# Patient Record
Sex: MALE | Employment: FULL TIME | URBAN - METROPOLITAN AREA
[De-identification: names, ages, dates, MRNs, and addresses within clinical notes are randomized per-mention and may not be internally consistent; named-entity substitution may affect disease eponyms.]

---

## 2020-06-28 ENCOUNTER — ANESTHESIA (OUTPATIENT)
Dept: SURGERY | Facility: CLINIC | Age: 55
DRG: 854 | End: 2020-06-28
Payer: COMMERCIAL

## 2020-06-28 ENCOUNTER — HOSPITAL ENCOUNTER (INPATIENT)
Facility: CLINIC | Age: 55
LOS: 3 days | Discharge: HOME | DRG: 854 | End: 2020-07-01
Attending: EMERGENCY MEDICINE | Admitting: INTERNAL MEDICINE
Payer: COMMERCIAL

## 2020-06-28 ENCOUNTER — APPOINTMENT (OUTPATIENT)
Dept: GENERAL RADIOLOGY | Facility: CLINIC | Age: 55
DRG: 854 | End: 2020-06-28
Attending: EMERGENCY MEDICINE
Payer: COMMERCIAL

## 2020-06-28 ENCOUNTER — ANESTHESIA EVENT (OUTPATIENT)
Dept: SURGERY | Facility: CLINIC | Age: 55
DRG: 854 | End: 2020-06-28
Payer: COMMERCIAL

## 2020-06-28 ENCOUNTER — APPOINTMENT (OUTPATIENT)
Dept: ULTRASOUND IMAGING | Facility: CLINIC | Age: 55
DRG: 854 | End: 2020-06-28
Attending: EMERGENCY MEDICINE
Payer: COMMERCIAL

## 2020-06-28 ENCOUNTER — APPOINTMENT (OUTPATIENT)
Dept: GENERAL RADIOLOGY | Facility: CLINIC | Age: 55
DRG: 854 | End: 2020-06-28
Attending: INTERNAL MEDICINE
Payer: COMMERCIAL

## 2020-06-28 DIAGNOSIS — E11.9 TYPE 2 DIABETES MELLITUS WITHOUT COMPLICATION, WITHOUT LONG-TERM CURRENT USE OF INSULIN (H): ICD-10-CM

## 2020-06-28 DIAGNOSIS — K85.90 ACUTE PANCREATITIS, UNSPECIFIED COMPLICATION STATUS, UNSPECIFIED PANCREATITIS TYPE: ICD-10-CM

## 2020-06-28 DIAGNOSIS — K83.09 CHOLANGITIS (H): Primary | ICD-10-CM

## 2020-06-28 DIAGNOSIS — Z03.818 ENCNTR FOR OBS FOR SUSP EXPSR TO OTH BIOLG AGENTS RULED OUT: ICD-10-CM

## 2020-06-28 DIAGNOSIS — K83.9 HEPATOBILIARY TRACT DISEASE: ICD-10-CM

## 2020-06-28 LAB
ALBUMIN SERPL-MCNC: 3.8 G/DL (ref 3.4–5)
ALP SERPL-CCNC: 155 U/L (ref 40–150)
ALT SERPL W P-5'-P-CCNC: 343 U/L (ref 0–70)
ANION GAP SERPL CALCULATED.3IONS-SCNC: 7 MMOL/L (ref 3–14)
AST SERPL W P-5'-P-CCNC: 494 U/L (ref 0–45)
BASOPHILS # BLD AUTO: 0 10E9/L (ref 0–0.2)
BASOPHILS NFR BLD AUTO: 0.1 %
BILIRUB SERPL-MCNC: 3.2 MG/DL (ref 0.2–1.3)
BUN SERPL-MCNC: 14 MG/DL (ref 7–30)
CALCIUM SERPL-MCNC: 9.1 MG/DL (ref 8.5–10.1)
CHLORIDE SERPL-SCNC: 104 MMOL/L (ref 94–109)
CO2 SERPL-SCNC: 27 MMOL/L (ref 20–32)
CREAT SERPL-MCNC: 1.08 MG/DL (ref 0.66–1.25)
DIFFERENTIAL METHOD BLD: ABNORMAL
EOSINOPHIL # BLD AUTO: 0 10E9/L (ref 0–0.7)
EOSINOPHIL NFR BLD AUTO: 0.1 %
ERYTHROCYTE [DISTWIDTH] IN BLOOD BY AUTOMATED COUNT: 12.7 % (ref 10–15)
GFR SERPL CREATININE-BSD FRML MDRD: 77 ML/MIN/{1.73_M2}
GLUCOSE BLDC GLUCOMTR-MCNC: 156 MG/DL (ref 70–99)
GLUCOSE BLDC GLUCOMTR-MCNC: 170 MG/DL (ref 70–99)
GLUCOSE BLDC GLUCOMTR-MCNC: 185 MG/DL (ref 70–99)
GLUCOSE BLDC GLUCOMTR-MCNC: 192 MG/DL (ref 70–99)
GLUCOSE BLDC GLUCOMTR-MCNC: 220 MG/DL (ref 70–99)
GLUCOSE SERPL-MCNC: 259 MG/DL (ref 70–99)
HBA1C MFR BLD: 6.9 % (ref 0–5.6)
HCT VFR BLD AUTO: 44.9 % (ref 40–53)
HGB BLD-MCNC: 14.6 G/DL (ref 13.3–17.7)
IMM GRANULOCYTES # BLD: 0.1 10E9/L (ref 0–0.4)
IMM GRANULOCYTES NFR BLD: 0.4 %
INR PPP: 0.98 (ref 0.86–1.14)
INTERPRETATION ECG - MUSE: NORMAL
LACTATE BLD-SCNC: 1 MMOL/L (ref 0.7–2)
LIPASE SERPL-CCNC: 998 U/L (ref 73–393)
LYMPHOCYTES # BLD AUTO: 0.4 10E9/L (ref 0.8–5.3)
LYMPHOCYTES NFR BLD AUTO: 2.5 %
MCH RBC QN AUTO: 28.2 PG (ref 26.5–33)
MCHC RBC AUTO-ENTMCNC: 32.5 G/DL (ref 31.5–36.5)
MCV RBC AUTO: 87 FL (ref 78–100)
MONOCYTES # BLD AUTO: 0.6 10E9/L (ref 0–1.3)
MONOCYTES NFR BLD AUTO: 3.5 %
NEUTROPHILS # BLD AUTO: 15.1 10E9/L (ref 1.6–8.3)
NEUTROPHILS NFR BLD AUTO: 93.4 %
NRBC # BLD AUTO: 0 10*3/UL
NRBC BLD AUTO-RTO: 0 /100
NT-PROBNP SERPL-MCNC: 50 PG/ML (ref 0–900)
PLATELET # BLD AUTO: 329 10E9/L (ref 150–450)
POTASSIUM SERPL-SCNC: 3.8 MMOL/L (ref 3.4–5.3)
PROT SERPL-MCNC: 7.6 G/DL (ref 6.8–8.8)
RBC # BLD AUTO: 5.17 10E12/L (ref 4.4–5.9)
SARS-COV-2 PCR COMMENT: NORMAL
SARS-COV-2 RNA SPEC QL NAA+PROBE: NEGATIVE
SARS-COV-2 RNA SPEC QL NAA+PROBE: NORMAL
SODIUM SERPL-SCNC: 138 MMOL/L (ref 133–144)
SPECIMEN SOURCE: NORMAL
SPECIMEN SOURCE: NORMAL
TROPONIN I BLD-MCNC: 0 UG/L (ref 0–0.08)
TROPONIN I SERPL-MCNC: <0.015 UG/L (ref 0–0.04)
WBC # BLD AUTO: 16.1 10E9/L (ref 4–11)

## 2020-06-28 PROCEDURE — 96367 TX/PROPH/DG ADDL SEQ IV INF: CPT | Performed by: EMERGENCY MEDICINE

## 2020-06-28 PROCEDURE — 12000001 ZZH R&B MED SURG/OB UMMC

## 2020-06-28 PROCEDURE — 85610 PROTHROMBIN TIME: CPT | Performed by: EMERGENCY MEDICINE

## 2020-06-28 PROCEDURE — 76705 ECHO EXAM OF ABDOMEN: CPT

## 2020-06-28 PROCEDURE — U0003 INFECTIOUS AGENT DETECTION BY NUCLEIC ACID (DNA OR RNA); SEVERE ACUTE RESPIRATORY SYNDROME CORONAVIRUS 2 (SARS-COV-2) (CORONAVIRUS DISEASE [COVID-19]), AMPLIFIED PROBE TECHNIQUE, MAKING USE OF HIGH THROUGHPUT TECHNOLOGIES AS DESCRIBED BY CMS-2020-01-R: HCPCS | Performed by: EMERGENCY MEDICINE

## 2020-06-28 PROCEDURE — 25500064 ZZH RX 255 OP 636: Performed by: INTERNAL MEDICINE

## 2020-06-28 PROCEDURE — 96375 TX/PRO/DX INJ NEW DRUG ADDON: CPT | Performed by: EMERGENCY MEDICINE

## 2020-06-28 PROCEDURE — 0F798DZ DILATION OF COMMON BILE DUCT WITH INTRALUMINAL DEVICE, VIA NATURAL OR ARTIFICIAL OPENING ENDOSCOPIC: ICD-10-PCS | Performed by: INTERNAL MEDICINE

## 2020-06-28 PROCEDURE — 40000141 ZZH STATISTIC PERIPHERAL IV START W/O US GUIDANCE

## 2020-06-28 PROCEDURE — 00000146 ZZHCL STATISTIC GLUCOSE BY METER IP

## 2020-06-28 PROCEDURE — 99285 EMERGENCY DEPT VISIT HI MDM: CPT | Mod: 25 | Performed by: EMERGENCY MEDICINE

## 2020-06-28 PROCEDURE — 93005 ELECTROCARDIOGRAM TRACING: CPT | Performed by: EMERGENCY MEDICINE

## 2020-06-28 PROCEDURE — 25000125 ZZHC RX 250: Performed by: NURSE ANESTHETIST, CERTIFIED REGISTERED

## 2020-06-28 PROCEDURE — 25000125 ZZHC RX 250: Performed by: ANESTHESIOLOGY

## 2020-06-28 PROCEDURE — 71000014 ZZH RECOVERY PHASE 1 LEVEL 2 FIRST HR: Performed by: INTERNAL MEDICINE

## 2020-06-28 PROCEDURE — 37000008 ZZH ANESTHESIA TECHNICAL FEE, 1ST 30 MIN: Performed by: INTERNAL MEDICINE

## 2020-06-28 PROCEDURE — C1769 GUIDE WIRE: HCPCS | Performed by: INTERNAL MEDICINE

## 2020-06-28 PROCEDURE — 36000059 ZZH SURGERY LEVEL 3 EA 15 ADDTL MIN UMMC: Performed by: INTERNAL MEDICINE

## 2020-06-28 PROCEDURE — 25000132 ZZH RX MED GY IP 250 OP 250 PS 637: Performed by: PHYSICIAN ASSISTANT

## 2020-06-28 PROCEDURE — 25000128 H RX IP 250 OP 636: Performed by: NURSE ANESTHETIST, CERTIFIED REGISTERED

## 2020-06-28 PROCEDURE — 25000128 H RX IP 250 OP 636: Performed by: ANESTHESIOLOGY

## 2020-06-28 PROCEDURE — 25000566 ZZH SEVOFLURANE, EA 15 MIN: Performed by: INTERNAL MEDICINE

## 2020-06-28 PROCEDURE — 40000279 XR SURGERY CARM FLUORO GREATER THAN 5 MIN W STILLS: Mod: TC

## 2020-06-28 PROCEDURE — 87077 CULTURE AEROBIC IDENTIFY: CPT | Performed by: EMERGENCY MEDICINE

## 2020-06-28 PROCEDURE — 84484 ASSAY OF TROPONIN QUANT: CPT

## 2020-06-28 PROCEDURE — 25800030 ZZH RX IP 258 OP 636: Performed by: STUDENT IN AN ORGANIZED HEALTH CARE EDUCATION/TRAINING PROGRAM

## 2020-06-28 PROCEDURE — 0FC98ZZ EXTIRPATION OF MATTER FROM COMMON BILE DUCT, VIA NATURAL OR ARTIFICIAL OPENING ENDOSCOPIC: ICD-10-PCS | Performed by: INTERNAL MEDICINE

## 2020-06-28 PROCEDURE — 25000132 ZZH RX MED GY IP 250 OP 250 PS 637: Performed by: STUDENT IN AN ORGANIZED HEALTH CARE EDUCATION/TRAINING PROGRAM

## 2020-06-28 PROCEDURE — 96361 HYDRATE IV INFUSION ADD-ON: CPT | Performed by: EMERGENCY MEDICINE

## 2020-06-28 PROCEDURE — 83690 ASSAY OF LIPASE: CPT | Performed by: EMERGENCY MEDICINE

## 2020-06-28 PROCEDURE — 87040 BLOOD CULTURE FOR BACTERIA: CPT | Performed by: EMERGENCY MEDICINE

## 2020-06-28 PROCEDURE — 85025 COMPLETE CBC W/AUTO DIFF WBC: CPT | Performed by: EMERGENCY MEDICINE

## 2020-06-28 PROCEDURE — 36000061 ZZH SURGERY LEVEL 3 W FLUORO 1ST 30 MIN - UMMC: Performed by: INTERNAL MEDICINE

## 2020-06-28 PROCEDURE — 84484 ASSAY OF TROPONIN QUANT: CPT | Performed by: EMERGENCY MEDICINE

## 2020-06-28 PROCEDURE — 40000170 ZZH STATISTIC PRE-PROCEDURE ASSESSMENT II: Performed by: INTERNAL MEDICINE

## 2020-06-28 PROCEDURE — 83880 ASSAY OF NATRIURETIC PEPTIDE: CPT | Performed by: EMERGENCY MEDICINE

## 2020-06-28 PROCEDURE — C1877 STENT, NON-COAT/COV W/O DEL: HCPCS | Performed by: INTERNAL MEDICINE

## 2020-06-28 PROCEDURE — 87186 SC STD MICRODIL/AGAR DIL: CPT | Performed by: EMERGENCY MEDICINE

## 2020-06-28 PROCEDURE — 83036 HEMOGLOBIN GLYCOSYLATED A1C: CPT | Performed by: EMERGENCY MEDICINE

## 2020-06-28 PROCEDURE — 25000128 H RX IP 250 OP 636: Performed by: EMERGENCY MEDICINE

## 2020-06-28 PROCEDURE — 25000128 H RX IP 250 OP 636: Performed by: STUDENT IN AN ORGANIZED HEALTH CARE EDUCATION/TRAINING PROGRAM

## 2020-06-28 PROCEDURE — 0F7D8DZ DILATION OF PANCREATIC DUCT WITH INTRALUMINAL DEVICE, VIA NATURAL OR ARTIFICIAL OPENING ENDOSCOPIC: ICD-10-PCS | Performed by: INTERNAL MEDICINE

## 2020-06-28 PROCEDURE — 83605 ASSAY OF LACTIC ACID: CPT | Performed by: EMERGENCY MEDICINE

## 2020-06-28 PROCEDURE — 25800030 ZZH RX IP 258 OP 636: Performed by: EMERGENCY MEDICINE

## 2020-06-28 PROCEDURE — 37000009 ZZH ANESTHESIA TECHNICAL FEE, EACH ADDTL 15 MIN: Performed by: INTERNAL MEDICINE

## 2020-06-28 PROCEDURE — C9803 HOPD COVID-19 SPEC COLLECT: HCPCS

## 2020-06-28 PROCEDURE — 71045 X-RAY EXAM CHEST 1 VIEW: CPT

## 2020-06-28 PROCEDURE — C1726 CATH, BAL DIL, NON-VASCULAR: HCPCS | Performed by: INTERNAL MEDICINE

## 2020-06-28 PROCEDURE — 80053 COMPREHEN METABOLIC PANEL: CPT | Performed by: EMERGENCY MEDICINE

## 2020-06-28 PROCEDURE — 99223 1ST HOSP IP/OBS HIGH 75: CPT | Mod: AI | Performed by: INTERNAL MEDICINE

## 2020-06-28 PROCEDURE — 96365 THER/PROPH/DIAG IV INF INIT: CPT | Performed by: EMERGENCY MEDICINE

## 2020-06-28 PROCEDURE — 25800030 ZZH RX IP 258 OP 636: Performed by: NURSE ANESTHETIST, CERTIFIED REGISTERED

## 2020-06-28 PROCEDURE — 25000131 ZZH RX MED GY IP 250 OP 636 PS 637: Performed by: PHYSICIAN ASSISTANT

## 2020-06-28 PROCEDURE — 27210794 ZZH OR GENERAL SUPPLY STERILE: Performed by: INTERNAL MEDICINE

## 2020-06-28 PROCEDURE — 93010 ELECTROCARDIOGRAM REPORT: CPT | Mod: Z6 | Performed by: EMERGENCY MEDICINE

## 2020-06-28 PROCEDURE — 87800 DETECT AGNT MULT DNA DIREC: CPT | Performed by: EMERGENCY MEDICINE

## 2020-06-28 PROCEDURE — 25000125 ZZHC RX 250: Performed by: INTERNAL MEDICINE

## 2020-06-28 DEVICE — IMPLANTABLE DEVICE: Type: IMPLANTABLE DEVICE | Site: BILE DUCT | Status: FUNCTIONAL

## 2020-06-28 DEVICE — STENT FREEMAN PANCREA FLEX 4FRX09CM W/O FLANGE SGL PIGTAIL
Type: IMPLANTABLE DEVICE | Site: PANCREATIC DUCT | Status: NON-FUNCTIONAL
Removed: 2020-06-30

## 2020-06-28 RX ORDER — METFORMIN HCL 500 MG
2000 TABLET, EXTENDED RELEASE 24 HR ORAL
COMMUNITY

## 2020-06-28 RX ORDER — ONDANSETRON 2 MG/ML
INJECTION INTRAMUSCULAR; INTRAVENOUS PRN
Status: DISCONTINUED | OUTPATIENT
Start: 2020-06-28 | End: 2020-06-28

## 2020-06-28 RX ORDER — LIDOCAINE 40 MG/G
CREAM TOPICAL
Status: DISCONTINUED | OUTPATIENT
Start: 2020-06-28 | End: 2020-07-01 | Stop reason: HOSPADM

## 2020-06-28 RX ORDER — DEXTROSE MONOHYDRATE 25 G/50ML
25-50 INJECTION, SOLUTION INTRAVENOUS
Status: DISCONTINUED | OUTPATIENT
Start: 2020-06-28 | End: 2020-06-29

## 2020-06-28 RX ORDER — ONDANSETRON 4 MG/1
4 TABLET, ORALLY DISINTEGRATING ORAL EVERY 30 MIN PRN
Status: DISCONTINUED | OUTPATIENT
Start: 2020-06-28 | End: 2020-06-28 | Stop reason: HOSPADM

## 2020-06-28 RX ORDER — SIMVASTATIN 10 MG
10 TABLET ORAL AT BEDTIME
Status: ON HOLD | COMMUNITY
End: 2020-06-28

## 2020-06-28 RX ORDER — FENTANYL CITRATE 50 UG/ML
25-50 INJECTION, SOLUTION INTRAMUSCULAR; INTRAVENOUS
Status: DISCONTINUED | OUTPATIENT
Start: 2020-06-28 | End: 2020-06-28 | Stop reason: HOSPADM

## 2020-06-28 RX ORDER — SIMVASTATIN 10 MG
20 TABLET ORAL AT BEDTIME
Status: DISCONTINUED | OUTPATIENT
Start: 2020-06-28 | End: 2020-07-01 | Stop reason: HOSPADM

## 2020-06-28 RX ORDER — DEXAMETHASONE SODIUM PHOSPHATE 4 MG/ML
INJECTION, SOLUTION INTRA-ARTICULAR; INTRALESIONAL; INTRAMUSCULAR; INTRAVENOUS; SOFT TISSUE PRN
Status: DISCONTINUED | OUTPATIENT
Start: 2020-06-28 | End: 2020-06-28

## 2020-06-28 RX ORDER — FENTANYL CITRATE 50 UG/ML
INJECTION, SOLUTION INTRAMUSCULAR; INTRAVENOUS PRN
Status: DISCONTINUED | OUTPATIENT
Start: 2020-06-28 | End: 2020-06-28

## 2020-06-28 RX ORDER — SIMVASTATIN 20 MG
20 TABLET ORAL AT BEDTIME
Status: ON HOLD | COMMUNITY
End: 2020-07-01

## 2020-06-28 RX ORDER — IOPAMIDOL 510 MG/ML
INJECTION, SOLUTION INTRAVASCULAR PRN
Status: DISCONTINUED | OUTPATIENT
Start: 2020-06-28 | End: 2020-06-28 | Stop reason: HOSPADM

## 2020-06-28 RX ORDER — CEFTRIAXONE 2 G/1
2 INJECTION, POWDER, FOR SOLUTION INTRAMUSCULAR; INTRAVENOUS ONCE
Status: COMPLETED | OUTPATIENT
Start: 2020-06-28 | End: 2020-06-28

## 2020-06-28 RX ORDER — ACETAMINOPHEN 325 MG/1
650 TABLET ORAL EVERY 6 HOURS PRN
Status: DISCONTINUED | OUTPATIENT
Start: 2020-06-28 | End: 2020-07-01 | Stop reason: HOSPADM

## 2020-06-28 RX ORDER — CEFTRIAXONE 2 G/1
2 INJECTION, POWDER, FOR SOLUTION INTRAMUSCULAR; INTRAVENOUS EVERY 24 HOURS
Status: DISCONTINUED | OUTPATIENT
Start: 2020-06-29 | End: 2020-06-28

## 2020-06-28 RX ORDER — HYDROMORPHONE HYDROCHLORIDE 1 MG/ML
0.2 INJECTION, SOLUTION INTRAMUSCULAR; INTRAVENOUS; SUBCUTANEOUS
Status: DISCONTINUED | OUTPATIENT
Start: 2020-06-28 | End: 2020-06-30

## 2020-06-28 RX ORDER — PROPOFOL 10 MG/ML
INJECTION, EMULSION INTRAVENOUS PRN
Status: DISCONTINUED | OUTPATIENT
Start: 2020-06-28 | End: 2020-06-28

## 2020-06-28 RX ORDER — LIDOCAINE 40 MG/G
CREAM TOPICAL
Status: DISCONTINUED | OUTPATIENT
Start: 2020-06-28 | End: 2020-06-28 | Stop reason: HOSPADM

## 2020-06-28 RX ORDER — NALOXONE HYDROCHLORIDE 0.4 MG/ML
.1-.4 INJECTION, SOLUTION INTRAMUSCULAR; INTRAVENOUS; SUBCUTANEOUS
Status: ACTIVE | OUTPATIENT
Start: 2020-06-28 | End: 2020-06-29

## 2020-06-28 RX ORDER — NICOTINE POLACRILEX 4 MG
15-30 LOZENGE BUCCAL
Status: DISCONTINUED | OUTPATIENT
Start: 2020-06-28 | End: 2020-06-29

## 2020-06-28 RX ORDER — GLIMEPIRIDE 2 MG/1
2 TABLET ORAL
COMMUNITY

## 2020-06-28 RX ORDER — HYDROMORPHONE HYDROCHLORIDE 1 MG/ML
0.5 INJECTION, SOLUTION INTRAMUSCULAR; INTRAVENOUS; SUBCUTANEOUS ONCE
Status: COMPLETED | OUTPATIENT
Start: 2020-06-28 | End: 2020-06-28

## 2020-06-28 RX ORDER — HYDROMORPHONE HYDROCHLORIDE 1 MG/ML
.3-.5 INJECTION, SOLUTION INTRAMUSCULAR; INTRAVENOUS; SUBCUTANEOUS EVERY 5 MIN PRN
Status: DISCONTINUED | OUTPATIENT
Start: 2020-06-28 | End: 2020-06-28 | Stop reason: HOSPADM

## 2020-06-28 RX ORDER — LIDOCAINE HYDROCHLORIDE 20 MG/ML
INJECTION, SOLUTION INFILTRATION; PERINEURAL PRN
Status: DISCONTINUED | OUTPATIENT
Start: 2020-06-28 | End: 2020-06-28

## 2020-06-28 RX ORDER — SODIUM CHLORIDE, SODIUM LACTATE, POTASSIUM CHLORIDE, CALCIUM CHLORIDE 600; 310; 30; 20 MG/100ML; MG/100ML; MG/100ML; MG/100ML
INJECTION, SOLUTION INTRAVENOUS CONTINUOUS
Status: ACTIVE | OUTPATIENT
Start: 2020-06-28 | End: 2020-06-30

## 2020-06-28 RX ORDER — SODIUM CHLORIDE, SODIUM LACTATE, POTASSIUM CHLORIDE, CALCIUM CHLORIDE 600; 310; 30; 20 MG/100ML; MG/100ML; MG/100ML; MG/100ML
INJECTION, SOLUTION INTRAVENOUS CONTINUOUS PRN
Status: DISCONTINUED | OUTPATIENT
Start: 2020-06-28 | End: 2020-06-28

## 2020-06-28 RX ORDER — NALOXONE HYDROCHLORIDE 0.4 MG/ML
.1-.4 INJECTION, SOLUTION INTRAMUSCULAR; INTRAVENOUS; SUBCUTANEOUS
Status: DISCONTINUED | OUTPATIENT
Start: 2020-06-28 | End: 2020-07-01 | Stop reason: HOSPADM

## 2020-06-28 RX ORDER — ONDANSETRON 2 MG/ML
4 INJECTION INTRAMUSCULAR; INTRAVENOUS EVERY 30 MIN PRN
Status: DISCONTINUED | OUTPATIENT
Start: 2020-06-28 | End: 2020-06-28 | Stop reason: HOSPADM

## 2020-06-28 RX ORDER — INDOMETHACIN 50 MG/1
100 SUPPOSITORY RECTAL
Status: DISCONTINUED | OUTPATIENT
Start: 2020-06-28 | End: 2020-06-28 | Stop reason: HOSPADM

## 2020-06-28 RX ORDER — FLUMAZENIL 0.1 MG/ML
0.2 INJECTION, SOLUTION INTRAVENOUS
Status: ACTIVE | OUTPATIENT
Start: 2020-06-28 | End: 2020-06-29

## 2020-06-28 RX ORDER — SODIUM CHLORIDE, SODIUM LACTATE, POTASSIUM CHLORIDE, CALCIUM CHLORIDE 600; 310; 30; 20 MG/100ML; MG/100ML; MG/100ML; MG/100ML
INJECTION, SOLUTION INTRAVENOUS CONTINUOUS
Status: DISCONTINUED | OUTPATIENT
Start: 2020-06-28 | End: 2020-06-28 | Stop reason: HOSPADM

## 2020-06-28 RX ADMIN — ROCURONIUM BROMIDE 50 MG: 10 INJECTION INTRAVENOUS at 11:25

## 2020-06-28 RX ADMIN — PHENYLEPHRINE HYDROCHLORIDE 100 MCG: 10 INJECTION INTRAVENOUS at 12:06

## 2020-06-28 RX ADMIN — ACETAMINOPHEN 650 MG: 325 TABLET, FILM COATED ORAL at 19:43

## 2020-06-28 RX ADMIN — MIDAZOLAM 2 MG: 1 INJECTION INTRAMUSCULAR; INTRAVENOUS at 11:10

## 2020-06-28 RX ADMIN — PHENYLEPHRINE HYDROCHLORIDE 100 MCG: 10 INJECTION INTRAVENOUS at 11:40

## 2020-06-28 RX ADMIN — METRONIDAZOLE 500 MG: 500 INJECTION, SOLUTION INTRAVENOUS at 06:24

## 2020-06-28 RX ADMIN — SODIUM CHLORIDE, POTASSIUM CHLORIDE, SODIUM LACTATE AND CALCIUM CHLORIDE: 600; 310; 30; 20 INJECTION, SOLUTION INTRAVENOUS at 11:15

## 2020-06-28 RX ADMIN — FENTANYL CITRATE 50 MCG: 50 INJECTION, SOLUTION INTRAMUSCULAR; INTRAVENOUS at 11:22

## 2020-06-28 RX ADMIN — CEFEPIME 2 G: 2 INJECTION, POWDER, FOR SOLUTION INTRAVENOUS at 20:02

## 2020-06-28 RX ADMIN — DEXAMETHASONE SODIUM PHOSPHATE 4 MG: 4 INJECTION, SOLUTION INTRA-ARTICULAR; INTRALESIONAL; INTRAMUSCULAR; INTRAVENOUS; SOFT TISSUE at 11:29

## 2020-06-28 RX ADMIN — SIMVASTATIN 20 MG: 10 TABLET, FILM COATED ORAL at 22:40

## 2020-06-28 RX ADMIN — GLUCAGON HYDROCHLORIDE 0.4 MG: KIT at 12:01

## 2020-06-28 RX ADMIN — HYDROMORPHONE HYDROCHLORIDE 0.5 MG: 1 INJECTION, SOLUTION INTRAMUSCULAR; INTRAVENOUS; SUBCUTANEOUS at 04:53

## 2020-06-28 RX ADMIN — ONDANSETRON 4 MG: 2 INJECTION INTRAMUSCULAR; INTRAVENOUS at 11:29

## 2020-06-28 RX ADMIN — SODIUM CHLORIDE, POTASSIUM CHLORIDE, SODIUM LACTATE AND CALCIUM CHLORIDE 1000 ML: 600; 310; 30; 20 INJECTION, SOLUTION INTRAVENOUS at 08:18

## 2020-06-28 RX ADMIN — SODIUM CHLORIDE, POTASSIUM CHLORIDE, SODIUM LACTATE AND CALCIUM CHLORIDE: 600; 310; 30; 20 INJECTION, SOLUTION INTRAVENOUS at 15:35

## 2020-06-28 RX ADMIN — SODIUM CHLORIDE 1000 ML: 9 INJECTION, SOLUTION INTRAVENOUS at 04:52

## 2020-06-28 RX ADMIN — PROCHLORPERAZINE EDISYLATE 10 MG: 5 INJECTION INTRAMUSCULAR; INTRAVENOUS at 04:55

## 2020-06-28 RX ADMIN — METRONIDAZOLE 500 MG: 500 INJECTION, SOLUTION INTRAVENOUS at 22:40

## 2020-06-28 RX ADMIN — FENTANYL CITRATE 50 MCG: 50 INJECTION, SOLUTION INTRAMUSCULAR; INTRAVENOUS at 11:52

## 2020-06-28 RX ADMIN — CEFTRIAXONE SODIUM 2 G: 2 INJECTION, POWDER, FOR SOLUTION INTRAMUSCULAR; INTRAVENOUS at 05:59

## 2020-06-28 RX ADMIN — METRONIDAZOLE 500 MG: 500 INJECTION, SOLUTION INTRAVENOUS at 15:34

## 2020-06-28 RX ADMIN — LIDOCAINE HYDROCHLORIDE 100 MG: 20 INJECTION, SOLUTION INFILTRATION; PERINEURAL at 11:22

## 2020-06-28 RX ADMIN — SUGAMMADEX 200 MG: 100 INJECTION, SOLUTION INTRAVENOUS at 12:21

## 2020-06-28 RX ADMIN — INSULIN ASPART 1 UNITS: 100 INJECTION, SOLUTION INTRAVENOUS; SUBCUTANEOUS at 16:36

## 2020-06-28 RX ADMIN — Medication 100 MG: at 11:22

## 2020-06-28 RX ADMIN — SODIUM CHLORIDE 1000 ML: 900 INJECTION, SOLUTION INTRAVENOUS at 09:33

## 2020-06-28 RX ADMIN — PROPOFOL 160 MG: 10 INJECTION, EMULSION INTRAVENOUS at 11:22

## 2020-06-28 ASSESSMENT — PAIN DESCRIPTION - DESCRIPTORS: DESCRIPTORS: HEADACHE

## 2020-06-28 ASSESSMENT — ACTIVITIES OF DAILY LIVING (ADL)
RETIRED_EATING: 0-->INDEPENDENT
DRESS: 0-->INDEPENDENT
BATHING: 0-->INDEPENDENT
TOILETING: 0-->INDEPENDENT
SWALLOWING: 0-->SWALLOWS FOODS/LIQUIDS WITHOUT DIFFICULTY
ADLS_ACUITY_SCORE: 10
ADLS_ACUITY_SCORE: 12
RETIRED_COMMUNICATION: 0-->UNDERSTANDS/COMMUNICATES WITHOUT DIFFICULTY

## 2020-06-28 ASSESSMENT — ENCOUNTER SYMPTOMS
VOMITING: 1
EYE REDNESS: 0
NECK STIFFNESS: 0
CONSTIPATION: 1
ARTHRALGIAS: 0
ABDOMINAL PAIN: 1
HEADACHES: 0
DIFFICULTY URINATING: 0
COLOR CHANGE: 0
SHORTNESS OF BREATH: 0
NAUSEA: 1
FEVER: 0
CONFUSION: 0

## 2020-06-28 ASSESSMENT — MIFFLIN-ST. JEOR: SCORE: 1760.25

## 2020-06-28 NOTE — ED NOTES
[facility], [place]   ED Nurse to Floor Handoff     Mio Benoit is a 55 year old male who speaks English and lives alone,  in a home  They arrived in the ED by ambulance from home    ED Chief Complaint: Chest Pain    ED Dx;   Final diagnoses:   Hepatobiliary tract disease         Needed?: No    Allergies:   Allergies   Allergen Reactions     Amoxicillin    .  Past Medical Hx:   Past Medical History:   Diagnosis Date     Diabetes (H)       Baseline Mental status: WDL  Current Mental Status changes: at basesline    Infection present or suspected this encounter: cultures pending  Sepsis suspected: No  Isolation type: No active isolations     Activity level - Baseline/Home:  Independent  Activity Level - Current:   Stand with Assist    Bariatric equipment needed?: No    In the ED these meds were given:   Medications   metroNIDAZOLE (FLAGYL) infusion 500 mg (500 mg Intravenous New Bag 6/28/20 0624)   0.9% sodium chloride BOLUS (0 mLs Intravenous Stopped 6/28/20 0624)   prochlorperazine (COMPAZINE) injection 10 mg (10 mg Intravenous Given 6/28/20 0455)   HYDROmorphone (PF) (DILAUDID) injection 0.5 mg (0.5 mg Intravenous Given 6/28/20 0453)   cefTRIAXone (ROCEPHIN) 2 g vial to attach to  ml bag for ADULTS or NS 50 ml bag for PEDS (0 g Intravenous Stopped 6/28/20 0624)       Drips running?  No    Home pump  No    Current LDAs  Peripheral IV 06/28/20 Right (Active)   Number of days: 0       Labs results:   Labs Ordered and Resulted from Time of ED Arrival Up to the Time of Departure from the ED   CBC WITH PLATELETS DIFFERENTIAL - Abnormal; Notable for the following components:       Result Value    WBC 16.1 (*)     Absolute Neutrophil 15.1 (*)     Absolute Lymphocytes 0.4 (*)     All other components within normal limits   COMPREHENSIVE METABOLIC PANEL - Abnormal; Notable for the following components:    Glucose 259 (*)     Bilirubin Total 3.2 (*)     Alkaline Phosphatase  155 (*)      (*)      (*)     All other components within normal limits   LIPASE - Abnormal; Notable for the following components:    Lipase 998 (*)     All other components within normal limits   TROPONIN I   NT PROBNP INPATIENT   COVID-19 VIRUS (CORONAVIRUS) BY PCR   LACTIC ACID WHOLE BLOOD   ROUTINE UA WITH MICROSCOPIC   PERIPHERAL IV CATHETER   TROPONIN POCT   BLOOD CULTURE   BLOOD CULTURE       Imaging Studies:   Recent Results (from the past 24 hour(s))   XR Chest Port 1 View    Narrative    EXAM: CHEST SINGLE VIEW PORTABLE  LOCATION: [facility]  DATE/TIME: 06/28/2020, 5:04 AM    INDICATION: Chest pain.  COMPARISON: None.    FINDINGS: The lungs are clear. Normal size cardiac silhouette      Impression    IMPRESSION: No evidence of active cardiopulmonary disease.      Abdomen US, limited (RUQ only)    Narrative    EXAMINATION: Limited Abdominal Ultrasound, 6/28/2020 6:42 AM     COMPARISON: None    History: RUQ Pain, evaluate for cholecystitis, RUQ Pain, evaluate for  cholecystitis.     FINDINGS:   Fluid: No evidence of ascites or pleural effusions.    Liver: The liver demonstrates mild hyperechoic architecture, measuring  14.5 cm in craniocaudal dimension. There is no focal mass.     Gallbladder: There is no wall thickening, pericholecystic fluid,  positive sonographic Moore's sign or evidence for cholelithiasis.    Bile Ducts: Intrahepatic biliary system appears normal caliber.  The  common bile duct measures 9.5 mm in diameter.    Pancreas: Visualized portions of the head and body of the pancreas are  unremarkable.     Kidney: The right kidney measures 12.2 cm long. There is no  hydronephrosis or hydroureter, no shadowing renal calculi, cystic  lesion or mass.       Impression    IMPRESSION:   1.  Hyperechoic liver parenchyma which can be seen in setting of  intrinsic intraparenchymal disease.  2.  Enlarged common bile duct without additional evidence of  cholelithiasis,  "choledocholithiasis or cholecystitis.     I have personally reviewed the examination and initial interpretation  and I agree with the findings.    OUMAR JACQUES MD       Recent vital signs:   BP (!) 141/82   Pulse 99   Temp 98.5  F (36.9  C) (Oral)   Resp 17   Ht 1.727 m (5' 8\")   Wt 94.6 kg (208 lb 8 oz)   SpO2 95%   BMI 31.70 kg/m      Freddie Coma Scale Score: 15 (06/28/20 0437)       Cardiac Rhythm: Normal Sinus  Pt needs tele? Yes  Skin/wound Issues: None    Code Status: Full Code    Pain control: good    Nausea control: good    Abnormal labs/tests/findings requiring intervention: see results. WBC 16.2    Family present during ED course? No   Family Comments/Social Situation comments: patient pleasant and cooperative    Tasks needing completion: DANNIE Martinez, RN  [phone] [facility] ED    "

## 2020-06-28 NOTE — PROGRESS NOTES
Admitted/transferred from: PACU  2 RN full   skin assessment completed by Yogesh Garza, RN and Светлана Oakes RN.  Skin assessment finding: skin intact, no problems   Interventions/actions: other N/A     Will continue to monitor.

## 2020-06-28 NOTE — ANESTHESIA PREPROCEDURE EVALUATION
"Anesthesia Pre-Procedure Evaluation    Patient: Mio Benoit   MRN:     [id number] Gender:   male   Age:    55 year old :      1965        Preoperative Diagnosis: Cholangitis [K83.09]   Procedure(s):  ENDOSCOPIC RETROGRADE CHOLANGIOPANCREATOGRAPHY     LABS:  CBC:   Lab Results   Component Value Date    WBC 16.1 (H) 2020    HGB 14.6 2020    HCT 44.9 2020     2020     BMP:   Lab Results   Component Value Date     2020     2002    POTASSIUM 3.8 2020    POTASSIUM 3.6 2002    CHLORIDE 104 2020    CHLORIDE 102 2002    CO2 27 2020    CO2 27 2002    BUN 14 2020    BUN 12 2002    CR 1.08 2020    CR 1.0 2002     (H) 2020     (H) 2002     COAGS:   Lab Results   Component Value Date    INR 0.98 2020     POC: No results found for: BGM, HCG, HCGS  OTHER:   Lab Results   Component Value Date    LACT 1.0 2020    ROMEO 9.1 2020    ALBUMIN 3.8 2020    PROTTOTAL 7.6 2020     (H) 2020     (H) 2020    ALKPHOS 155 (H) 2020    BILITOTAL 3.2 (H) 2020    LIPASE 998 (H) 2020        Preop Vitals    BP Readings from Last 3 Encounters:   20 (!) 141/87    Pulse Readings from Last 3 Encounters:   20 103      Resp Readings from Last 3 Encounters:   20 19    SpO2 Readings from Last 3 Encounters:   20 93%      Temp Readings from Last 1 Encounters:   20 36.9  C (98.5  F) (Oral)    Ht Readings from Last 1 Encounters:   20 1.727 m (5' 8\")      Wt Readings from Last 1 Encounters:   20 94.6 kg (208 lb 8 oz)    Estimated body mass index is 31.7 kg/m  as calculated from the following:    Height as of this encounter: 1.727 m (5' 8\").    Weight as of this encounter: 94.6 kg (208 lb 8 oz).     LDA:  Peripheral IV 20 Right (Active)   Number of days: 0        Past Medical History:   Diagnosis " Date     Diabetes (H)       History reviewed. No pertinent surgical history.   Allergies   Allergen Reactions     Amoxicillin         Anesthesia Evaluation     . Pt has had prior anesthetic.     No history of anesthetic complications          ROS/MED HX    ENT/Pulmonary: Comment: COVID negative 6/28 - neg pulmonary ROS     Neurologic:  - neg neurologic ROS     Cardiovascular:     (+) Dyslipidemia, ----. : . . . :. .       METS/Exercise Tolerance:  >4 METS   Hematologic:  - neg hematologic  ROS       Musculoskeletal:  - neg musculoskeletal ROS       GI/Hepatic: Comment: CBD dilation concerning for obstruction vs cholangitis        Renal/Genitourinary:  - ROS Renal section negative       Endo:     (+) type II DM Not using insulin .      Psychiatric:  - neg psychiatric ROS       Infectious Disease:         Malignancy:      - no malignancy   Other:    - neg other ROS                     PHYSICAL EXAM:   Mental Status/Neuro: A/A/O   Airway: Facies: Feasible  Mallampati: I  Mouth/Opening: Full  TM distance: > 6 cm  Neck ROM: Full   Respiratory: Auscultation: CTAB     Resp. Rate: Normal     Resp. Effort: Normal      CV: Rhythm: Regular  Rate: Age appropriate  Heart: Normal Sounds  Edema: None   Comments:      Dental: Normal Dentition                Assessment:   ASA SCORE: 2    H&P: History and physical reviewed and following examination; no interval change.   Smoking Status:  Non-Smoker/Unknown   NPO Status: NPO Appropriate     Plan:   Anes. Type:  General   Pre-Medication: None   Induction:  IV (Standard)   Airway: ETT; Oral   Access/Monitoring: PIV   Maintenance: Balanced     Postop Plan:   Postop Pain: Opioids  Postop Sedation/Airway: Not planned  Disposition: Inpatient/Admit     PONV Management:   Adult Risk Factors:, Non-Smoker, Postop Opioids   Prevention: Ondansetron, Dexamethasone     CONSENT: Direct conversation   Plan and risks discussed with: Patient   Blood Products: Consent Deferred (Minimal Blood Loss)                    Long Kemp, DO

## 2020-06-28 NOTE — H&P
[facility], [place]    History and Physical - Maroon 1 Service        Date of Admission:  6/28/2020    Assessment & Plan   Mio Benoit is a 55 year old male with a history of type 2 diabetes mellitus and hyperlipidemia admitted on 6/28/2020 with epigastric abdominal pain and rigors and was found to have cholangitis and gram negative destiny bacteremia.     Sepsis  Cholangitis  Gram negative destiny bacteremia  Leukocytosis to 16.1, tachycardia, and hypotension. Alk phos 155, , . Total bili 3.2. Abdominal ultrasound with enlarged common bile duct. S/p ERCP on 6/28. Now with 1/2 blood cultures positive for gram negative rods. Initially treated with ceftriaxone/metronidazole, will broaden for anti-pseudomonal coverage in the setting of sepsis and bacteremia (avoiding zosyn given amoxicillin allergy).   - Cefepime and metronidazole  - Surgery consult for cholecystectomy    Epigastric pain  Initially described as chest pain, EKG nonischemic and troponin negative. More likely early pancreatitis. Lipase elevated to 998, not quite 3x upper limit of normal. Ultrasound without evidence of pancreatitis. No CT to confirm. Will continue to monitor. Currently resolved.   - Tylenol PRN    Type 2 diabetes mellitus  - Hold home glimepiride and metformin  - Low dose sliding scale insulin  - Hypoglycemia protocol    Hyperlipidemia  - Continue home simvastatin     Diet: Clear Liquid Diet, NPO at midnight  Fluids: LR at 150 mL/hr  DVT Prophylaxis: Pneumatic Compression Devices  Guajardo Catheter: not present  Code Status: Full Code           Disposition Plan   Expected discharge: 2 - 3 days, recommended to prior living arrangement once adequate pain management/ tolerating PO medications, antibiotic plan established and surgical plan established.  Entered: Rebecca Swenson MD 06/28/2020, 6:54 PM        The patient's care was discussed with the Attending Physician, Dr. Victor.    Rebecca De Los Santos  "MD Leila Swenson 1 Service  [facility], [place]  Pager: [phone]  Please see sticky note for cross cover information  ______________________________________________________________________    Chief Complaint   Abdominal pain     History is obtained from the patient    History of Present Illness   Mio Benoit is a 55 year old male with history of hyperlipidemia and type 2 diabetes mellitus who presented with nausea, vomiting, and epigastric pain.     Symptoms started Thursday night, 6/25. He felt a tightening in his chest and then became \"violently ill\". He started vomiting, nonbloody, nonbilious emesis. He started feeling better the next morning, but by Friday evening was again having chest tightness, though it at moved downward, more near the xyphoid process. He was up all night with pain. This somewhat improved, but then on Saturday got bad again.     He's had on and off cold sweats. No measured fever at home. No bowel movement for several days. Noted to be rigoring in the ED.     ED  - Started on ceftriaxone/flagyl  - 2L IVF, 1L LR  - 50 mcg fentanyl x2  - 0.5 mg IV dilaudid x1  - Compazine x1    Review of Systems    The 10 point Review of Systems is negative other than noted in the HPI or here.   Endorses a dry throat.     Past Medical History    I have reviewed this patient's medical history and updated it with pertinent information if needed.   Past Medical History:   Diagnosis Date     Diabetes (H)         Past Surgical History   I have reviewed this patient's surgical history and updated it with pertinent information if needed.  History reviewed. No pertinent surgical history.   - History of surgery for a torn meniscus    Social History   I have reviewed this patient's social history and updated it with pertinent information if needed. Mio Benoit  reports that he has never smoked. He has never used smokeless tobacco. He reports current alcohol use. He reports " that he does not use drugs.   Approximately 2 alcoholic beverages per week on average.     Family History   I have reviewed this patient's family history and updated it with pertinent information if needed.   History reviewed. No pertinent family history.   Grandfather- history of stomach cancer  Mother- history of breast cancer  Father- history of prostate cancer  Multiple family members with gallbladder problems, coronary artery disease, and type 2 diabetes.     Prior to Admission Medications   Prior to Admission Medications   Prescriptions Last Dose Informant Patient Reported? Taking?   glimepiride (AMARYL) 2 MG tablet  Pharmacy Yes Yes   Sig: Take 2 mg by mouth every morning (before breakfast)   metFORMIN (GLUCOPHAGE-XR) 500 MG 24 hr tablet  Pharmacy Yes Yes   Sig: Take 2,000 mg by mouth every morning (before breakfast)   simvastatin (ZOCOR) 20 MG tablet  Pharmacy Yes Yes   Sig: Take 20 mg by mouth At Bedtime      Facility-Administered Medications: None     Allergies   Allergies   Allergen Reactions     Amoxicillin        Physical Exam   Vital Signs: Temp: 98.3  F (36.8  C) Temp src: Oral BP: 121/73 Pulse: 99 Heart Rate: 94 Resp: 17 SpO2: 96 % O2 Device: Nasal cannula Oxygen Delivery: 1 LPM  Weight: 208 lbs 8 oz    Constitutional: Awake and alert. Diaphoretic.   Eyes: No conjunctival injection.   ENT: Moist mucus membranes.   Respiratory: Breathing comfortably on room air. Clear to auscultation bilaterally.   Cardiovascular: RRR. Normal S1/S2. No murmurs appreciated.   GI: Soft, nontender, nondistended.   Skin: No appreciable jaundice. No rashes or lesions.   Musculoskeletal: No significant lower extremity edema.   Neurologic: Oriented. No facial asymmetry. Moves all extremities appropriately.     Data   Data reviewed today: I reviewed all medications, new labs and imaging results over the last 24 hours. I personally reviewed the EKG tracing showing normal sinus rhythm.    Recent Labs   Lab 06/28/20  3251  06/28/20  0432   WBC  --  16.1*   HGB  --  14.6   MCV  --  87   PLT  --  329   INR  --  0.98   NA  --  138   POTASSIUM  --  3.8   CHLORIDE  --  104   CO2  --  27   BUN  --  14   CR  --  1.08   ANIONGAP  --  7   ROMEO  --  9.1   GLC  --  259*   ALBUMIN  --  3.8   PROTTOTAL  --  7.6   BILITOTAL  --  3.2*   ALKPHOS  --  155*   ALT  --  343*   AST  --  494*   LIPASE  --  998*   TROPI  --  <0.015   TROPONIN 0.00  --      Ultrasound  IMPRESSION:   1.  Hyperechoic liver parenchyma which can be seen in setting of  intrinsic intraparenchymal disease.  2.  Enlarged common bile duct without additional evidence of  cholelithiasis, choledocholithiasis or cholecystitis.

## 2020-06-28 NOTE — ED NOTES
Bed: ED18  Expected date:   Expected time:   Means of arrival:   Comments:  H427  55M  Crushing chest pain 6/10 intermittent x 3 days

## 2020-06-28 NOTE — OR NURSING
Patient's Blood sugar on arrival was 220-Dr. August (Anesthesiologist) called regarding above Blood Sugar. Dr. August (Anesthesiologist) stated he would like to start floor order of sliding scale subcutaneous insulin, which would be 2 units of insulin Aspart Novolog. Inpatient pharmacy messaged to send insulin pen up. Insulin pen did not arrive before patient's transfer to  and pharmacy called and stated  They will tube up insulin pen to .  Yogesh Garza Rn on  called to inform him to look for insulin pen.

## 2020-06-28 NOTE — CONSULTS
GASTROENTEROLOGY CONSULTATION      Date of Admission:  6/28/2020           Reason for Consultation:   We were asked by Dr Berger of ED and Dr Rose of Internal Medicine to evaluate this patient with possible cholangitis         ASSESSMENT AND RECOMMENDATIONS:   Assessment:  55 year old male with a history of diabetes and hyperlipidemia who presents with right upper quadrant pain.  Labs show liver test with obstructive pattern, his lipase is mildly elevated at 998.  He is afebrile, however he has leukocytosis to 16, getting more tachycardic and now having rigors --concern for cholangitis.  Would also suspect bacteremia, blood cultures at this time are pending.  His mental status is intact.  Ultrasound shows dilated common bile duct to 9.5 mm.     Recommendations  -Agree with admission to medicine for further cares  -Agree with antibiotics with ceftriaxone 2 g daily and metronidazole given his penicillin allergy  -Please keep patient n.p.o.  -Given tachycardia, would recommend aggressive fluid resuscitation  -Follow blood cultures  -Will need an ERCP this hospitalization ; I have asked the patient is an urgent case for later today  -We will also need surgical consultation this hospitalization after cholangitis has been addressed    Further recommendations to follow after the ERCP.    Gastroenterology outpatient follow up recommendations: TBD    Thank you for involving us in this patient's care. Please do not hesitate to contact the GI service with any questions or concerns.     Pt care plan discussed with Dr. Ferrari, GI staff physician.    Sergio BILL MD  Gastroenterology Fellow  Division of Gastroenterology, Hepatology and Nutrition  [facility]    -------------------------------------------------------------------------------------------------------------------           History of Present Illness:   Mio Benoit is a 55 year old male with a history of diabetes and hyperlipidemia who is being  admitted to medicine with concern for cholangitis.    Patient presented to the emergency room with 3-day history of nausea, vomiting, right upper quadrant and epigastric pain.  Progressive symptoms; it was initially episodic, but since yesterday has been more persistent symptoms.  Has had diaphoresis and night sweats in the last couple days.  Pain radiates to the substernal area.  Denies any hematemesis, melena, hematochezia.  Denies any chest pain other than the radiating pain from the epigastrium or shortness of breath.  His initial concern was chest pain, so he called EMS who gave him nitroglycerin and Zofran, without much change in symptoms.     When he came to the ED, he received ceftriaxone and metronidazole, and 2 L of fluid.  At the time of my exam, patient had rigors, and was getting slightly more tachycardic.  He was answering questions appropriately.  He does report that he may have had confusion/lack of concentration on Friday, but this resolved.             Past Medical History:   Reviewed and edited as appropriate  Past Medical History:   Diagnosis Date     Diabetes (H)             Past Surgical History:   Reviewed and edited as appropriate   No prior abdominal surgeries         Previous Endoscopy:   No results found for this or any previous visit.         Social History:   Reviewed and edited as appropriate  Occasionally drinks alcohol; occasionally drinks 5-6 drinks during celebrations  Smokes cigars occasionally but no or cigarette smoking  No other drug use         Family History:   Reviewed and edited as appropriate  History reviewed. No pertinent family history.  No colorectal cancer history in the family  Sister had her gallbladder removed  Mother had breast cancer  Father had prostate cancer           Allergies:   Reviewed and edited as appropriate     Allergies   Allergen Reactions     Amoxicillin             Medications:     Current Facility-Administered Medications   Medication      "metroNIDAZOLE (FLAGYL) infusion 500 mg     Current Outpatient Medications   Medication Sig     metFORMIN (GLUCOPHAGE) 500 MG tablet Take 500 mg by mouth 2 times daily (with meals)     simvastatin (ZOCOR) 10 MG tablet Take 10 mg by mouth At Bedtime             Review of Systems:     A complete 10 point review of systems was performed and is negative except as noted in the HPI           Physical Exam:   BP (!) 141/82   Pulse 99   Temp 98.5  F (36.9  C) (Oral)   Resp 17   Ht 1.727 m (5' 8\")   Wt 94.6 kg (208 lb 8 oz)   SpO2 95%   BMI 31.70 kg/m    Wt:   Wt Readings from Last 2 Encounters:   06/28/20 94.6 kg (208 lb 8 oz)      Constitutional: cooperative, pleasant, appears diaphoretic, has rigors  Eyes: Sclera anicteric/injected  Ears/nose/mouth/throat: Normal oropharynx without ulcers or exudate, mucus membranes moist, hearing intact  Neck: supple, thyroid normal size  CV: No edema  Respiratory: Splinted breathing because of abdominal pain  Abd: Abdomen is soft, nondistended, tender to palpation in the epigastrium and right upper quadrant, no rebound or guarding  Skin: No acute lesions appreciated no jaundice appreciated  Neuro: AAO x 3, No asterixis  Psych: Normal affect  MSK: No gross deformities         Data:   Labs and imaging below were independently reviewed and interpreted    BMP  Recent Labs   Lab 06/28/20  0432      POTASSIUM 3.8   CHLORIDE 104   ROMEO 9.1   CO2 27   BUN 14   CR 1.08   *     CBC  Recent Labs   Lab 06/28/20  0432   WBC 16.1*   RBC 5.17   HGB 14.6   HCT 44.9   MCV 87   MCH 28.2   MCHC 32.5   RDW 12.7        INRNo lab results found in last 7 days.  LFTs  Recent Labs   Lab 06/28/20  0432   ALKPHOS 155*   *   *   BILITOTAL 3.2*   PROTTOTAL 7.6   ALBUMIN 3.8      PANC  Recent Labs   Lab 06/28/20  0432   LIPASE 998*       Imaging:    Abdominal Ultrasound  IMPRESSION:   1.  Hyperechoic liver parenchyma which can be seen in setting of  intrinsic intraparenchymal " disease.  2.  Enlarged common bile duct without additional evidence of  cholelithiasis, choledocholithiasis or cholecystitis.

## 2020-06-28 NOTE — ED TRIAGE NOTES
Pt BIBA for chest pain inferior of sternum. Crushing chest pain 6/10 for the past 3 evenings. Resolution with vomiting past few days. Chest pain bad enugh this evening he called 911. EKG SR 90-100s. 324 aspirin 0.4 nitrogylcerin 4 mg zofran Blood sugar 269. NS running. Tried pepto today without relief. 18 g R) AC.

## 2020-06-28 NOTE — ANESTHESIA POSTPROCEDURE EVALUATION
Anesthesia POST Procedure Evaluation    Patient: Mio Benoit   MRN:     [id number] Gender:   male   Age:    55 year old :      1965        Preoperative Diagnosis: Cholangitis [K83.09]   Procedure(s):  ENDOSCOPIC RETROGRADE CHOLANGIOPANCREATOGRAPHY, pancreatic duct stenting, biliary duct sphincterotomy with stone removal and stent placement   Postop Comments: No value filed.     Anesthesia Type: General       Disposition: Admission   Postop Pain Control: Uneventful            Sign Out: Well controlled pain   PONV: No   Neuro/Psych: Uneventful            Sign Out: Acceptable/Baseline neuro status   Airway/Respiratory: Uneventful            Sign Out: Acceptable/Baseline resp. status   CV/Hemodynamics: Uneventful            Sign Out: Acceptable CV status   Other NRE: NONE   DID A NON-ROUTINE EVENT OCCUR? No         Last Anesthesia Record Vitals:  CRNA VITALS  2020 1202 - 2020 1302      2020             Pulse:  112    SpO2:  95 %          Last PACU Vitals:  Vitals Value Taken Time   /84 2020  1:15 PM   Temp 37.2  C (99  F) 2020 12:38 PM   Pulse 103 2020  1:10 PM   Resp 19 2020 12:45 PM   SpO2 98 % 2020  1:15 PM   Temp src     NIBP     Pulse     SpO2     Resp     Temp     Ht Rate     Temp 2     Vitals shown include unvalidated device data.      Electronically Signed By: Long Kemp DO, 2020, 1:16 PM

## 2020-06-28 NOTE — PLAN OF CARE
VSS. Pt up with SBA. Voids spont with adequate UOP. Denies pain. MIV/IV ABX infusing through PIV. Plan for Gall Bladder removal tomorrow or Tuesday. Cont. POC.

## 2020-06-28 NOTE — ED PROVIDER NOTES
ED Provider Note  [facility]      History     Chief Complaint   Patient presents with     Chest Pain     HPI  Mio Benoit is a 55 year old male with a history of non-insulin-dependent diabetes who presents to the emergency department with epigastric abdominal and lower chest pain.  He states he has had the symptoms for the past 3 nights.  He becomes nauseous and vomits with resolution of the symptoms.  Today, the symptoms have been persisting.  They have been there for the last 9 hours.  He has some radiation up into the substernal region.  He complains of nausea.  He states he has not had a bowel movement in the past 2 days.  He denies preceding constipation.  No history of abdominal surgery.  He denies any dysuria, urgency, or frequency.  Patient states he did have some mild dyspnea 3 nights ago when he was feeling nauseous.  Patient denies any diaphoresis.  No exertional chest pain.  He denies symptoms during the day.  The patient received sublingual nitroglycerin, ondansetron, and aspirin from EMS without change in his symptoms.  Patient denies any leg pain or swelling.    Past Medical History  Past Medical History:   Diagnosis Date     Diabetes (H)      History reviewed. No pertinent surgical history.  metFORMIN (GLUCOPHAGE) 500 MG tablet  simvastatin (ZOCOR) 10 MG tablet      Allergies   Allergen Reactions     Amoxicillin      Past medical history, past surgical history, medications, and allergies were reviewed with the patient. Additional pertinent items: None    Family History  History reviewed. No pertinent family history.  Family history was reviewed with the patient. Additional pertinent items: None    Social History  Social History     Tobacco Use     Smoking status: Never Smoker     Smokeless tobacco: Never Used   Substance Use Topics     Alcohol use: Yes     Comment: 4 drinks/week     Drug use: Never      Social history was reviewed with the patient. Additional  "pertinent items: None    Review of Systems   Constitutional: Negative for fever.   HENT: Negative for congestion.    Eyes: Negative for redness.   Respiratory: Negative for shortness of breath.    Cardiovascular: Positive for chest pain.   Gastrointestinal: Positive for abdominal pain, constipation, nausea and vomiting.   Genitourinary: Negative for difficulty urinating.   Musculoskeletal: Negative for arthralgias and neck stiffness.   Skin: Negative for color change.   Neurological: Negative for headaches.   Psychiatric/Behavioral: Negative for confusion.   All other systems reviewed and are negative.    A complete review of systems was performed with pertinent positives and negatives noted in the HPI, and all other systems negative.    Physical Exam   BP: (!) 149/96  Pulse: 99  Heart Rate: 100  Temp: 98.5  F (36.9  C)  Resp: 20  Height: 172.7 cm (5' 8\")  Weight: 94.6 kg (208 lb 8 oz)  SpO2: 98 %  Physical Exam  Vitals signs and nursing note reviewed.   Constitutional:       General: He is not in acute distress.     Appearance: He is not diaphoretic.   HENT:      Head: Atraumatic.   Eyes:      General: No scleral icterus.     Pupils: Pupils are equal, round, and reactive to light.   Cardiovascular:      Rate and Rhythm: Normal rate and regular rhythm.   Pulmonary:      Effort: Pulmonary effort is normal. No respiratory distress.   Abdominal:      Palpations: Abdomen is soft.      Tenderness: There is abdominal tenderness in the right upper quadrant and epigastric area.   Musculoskeletal:         General: No tenderness.      Right lower leg: He exhibits no tenderness. No edema.      Left lower leg: He exhibits no tenderness. No edema.   Skin:     General: Skin is warm.      Findings: No rash.   Neurological:      General: No focal deficit present.         ED Course      Procedures             EKG Interpretation:      Interpreted by HANNAH ANDERSON MD, MD  Time reviewed: 0442  Symptoms at time of EKG: epigastric pain "   Rhythm: normal sinus   Rate:99  Axis: normal  Ectopy: none  Conduction: normal  ST Segments/ T Waves: No ST-T wave changes  Q Waves: none  Comparison to prior: No old EKG available    Clinical Impression: normal EKG         Results for orders placed or performed during the hospital encounter of 06/28/20   XR Chest Port 1 View     Status: None    Narrative    EXAM: CHEST SINGLE VIEW PORTABLE  LOCATION: [facility]  DATE/TIME: 06/28/2020, 5:04 AM    INDICATION: Chest pain.  COMPARISON: None.    FINDINGS: The lungs are clear. Normal size cardiac silhouette      Impression    IMPRESSION: No evidence of active cardiopulmonary disease.      Abdomen US, limited (RUQ only)     Status: None    Narrative    EXAMINATION: Limited Abdominal Ultrasound, 6/28/2020 6:42 AM     COMPARISON: None    History: RUQ Pain, evaluate for cholecystitis, RUQ Pain, evaluate for  cholecystitis.     FINDINGS:   Fluid: No evidence of ascites or pleural effusions.    Liver: The liver demonstrates mild hyperechoic architecture, measuring  14.5 cm in craniocaudal dimension. There is no focal mass.     Gallbladder: There is no wall thickening, pericholecystic fluid,  positive sonographic Moore's sign or evidence for cholelithiasis.    Bile Ducts: Intrahepatic biliary system appears normal caliber.  The  common bile duct measures 9.5 mm in diameter.    Pancreas: Visualized portions of the head and body of the pancreas are  unremarkable.     Kidney: The right kidney measures 12.2 cm long. There is no  hydronephrosis or hydroureter, no shadowing renal calculi, cystic  lesion or mass.       Impression    IMPRESSION:   1.  Hyperechoic liver parenchyma which can be seen in setting of  intrinsic intraparenchymal disease.  2.  Enlarged common bile duct without additional evidence of  cholelithiasis, choledocholithiasis or cholecystitis.     I have personally reviewed the examination and initial interpretation  and I agree with the  findings.    OUMAR JACQUES MD   CBC with platelets differential     Status: Abnormal   Result Value Ref Range    WBC 16.1 (H) 4.0 - 11.0 10e9/L    RBC Count 5.17 4.4 - 5.9 10e12/L    Hemoglobin 14.6 13.3 - 17.7 g/dL    Hematocrit 44.9 40.0 - 53.0 %    MCV 87 78 - 100 fl    MCH 28.2 26.5 - 33.0 pg    MCHC 32.5 31.5 - 36.5 g/dL    RDW 12.7 10.0 - 15.0 %    Platelet Count 329 150 - 450 10e9/L    Diff Method Automated Method     % Neutrophils 93.4 %    % Lymphocytes 2.5 %    % Monocytes 3.5 %    % Eosinophils 0.1 %    % Basophils 0.1 %    % Immature Granulocytes 0.4 %    Nucleated RBCs 0 0 /100    Absolute Neutrophil 15.1 (H) 1.6 - 8.3 10e9/L    Absolute Lymphocytes 0.4 (L) 0.8 - 5.3 10e9/L    Absolute Monocytes 0.6 0.0 - 1.3 10e9/L    Absolute Eosinophils 0.0 0.0 - 0.7 10e9/L    Absolute Basophils 0.0 0.0 - 0.2 10e9/L    Abs Immature Granulocytes 0.1 0 - 0.4 10e9/L    Absolute Nucleated RBC 0.0    Troponin I     Status: None   Result Value Ref Range    Troponin I ES <0.015 0.000 - 0.045 ug/L   Nt probnp inpatient (BNP)     Status: None   Result Value Ref Range    N-Terminal Pro BNP Inpatient 50 0 - 900 pg/mL   Comprehensive metabolic panel     Status: Abnormal   Result Value Ref Range    Sodium 138 133 - 144 mmol/L    Potassium 3.8 3.4 - 5.3 mmol/L    Chloride 104 94 - 109 mmol/L    Carbon Dioxide 27 20 - 32 mmol/L    Anion Gap 7 3 - 14 mmol/L    Glucose 259 (H) 70 - 99 mg/dL    Urea Nitrogen 14 7 - 30 mg/dL    Creatinine 1.08 0.66 - 1.25 mg/dL    GFR Estimate 77 >60 mL/min/[1.73_m2]    GFR Estimate If Black 90 >60 mL/min/[1.73_m2]    Calcium 9.1 8.5 - 10.1 mg/dL    Bilirubin Total 3.2 (H) 0.2 - 1.3 mg/dL    Albumin 3.8 3.4 - 5.0 g/dL    Protein Total 7.6 6.8 - 8.8 g/dL    Alkaline Phosphatase 155 (H) 40 - 150 U/L     (H) 0 - 70 U/L     (H) 0 - 45 U/L   Lipase     Status: Abnormal   Result Value Ref Range    Lipase 998 (H) 73 - 393 U/L   Asymptomatic COVID-19 Virus (Coronavirus) by PCR     Status: None     Specimen: Nasopharyngeal   Result Value Ref Range    COVID-19 Virus PCR to [facility] - Source Nasopharyngeal     COVID-19 Virus PCR to [facility] - Result       Test received-See reflex to IDDL test SARS CoV2 (COVID-19) Virus RT-PCR   Lactic acid     Status: None   Result Value Ref Range    Lactic Acid 1.0 0.7 - 2.0 mmol/L   EKG 12-lead, tracing only     Status: None   Result Value Ref Range    Interpretation ECG Click View Image link to view waveform and result    Troponin POCT     Status: None   Result Value Ref Range    Troponin I 0.00 0.00 - 0.08 ug/L   Blood culture     Status: None (Preliminary result)    Specimen: Arm, Right; Blood    Right Arm   Result Value Ref Range    Specimen Description Blood Right Arm     Culture Micro PENDING    Blood culture     Status: None (Preliminary result)    Specimen: Hand, Left; Blood    Left Hand   Result Value Ref Range    Specimen Description Blood Left Hand     Culture Micro PENDING      Medications   0.9% sodium chloride BOLUS (0 mLs Intravenous Stopped 6/28/20 0624)   prochlorperazine (COMPAZINE) injection 10 mg (10 mg Intravenous Given 6/28/20 0455)   HYDROmorphone (PF) (DILAUDID) injection 0.5 mg (0.5 mg Intravenous Given 6/28/20 0453)   cefTRIAXone (ROCEPHIN) 2 g vial to attach to  ml bag for ADULTS or NS 50 ml bag for PEDS (0 g Intravenous Stopped 6/28/20 0624)   metroNIDAZOLE (FLAGYL) infusion 500 mg (500 mg Intravenous New Bag 6/28/20 0624)        Assessments & Plan (with Medical Decision Making)   55 year old male with history of type 2 diabetes to the emergency department with upper abdominal pain with associated nausea and vomiting.  He has tenderness in the epigastrium and the right upper quadrant.  He has normal vital signs.  EKG and troponin are normal.  Do not suspect ACS.  Patient does have leukocytosis, elevated lipase level, and elevated liver enzymes including bilirubin.  The patient's presentation is concerning for biliary obstruction versus  ascending cholangitis.  The patient is penicillin allergic so was given ceftriaxone and metronidazole.  His right upper outer and ultrasound reveals biliary dilatation with a common bile duct to 9.5 mm.  There is no additional evidence for stones.  His findings were discussed with GI.  Will evaluate for possible ERCP today.  Will admit to internal medicine.    I have reviewed the nursing notes. I have reviewed the findings, diagnosis, plan and need for follow up with the patient.    New Prescriptions    No medications on file       Final diagnoses:   Hepatobiliary tract disease       --  TORRES ANDERSON MD, MD   Emergency Medicine   [facility], EMERGENCY DEPARTMENT  6/28/2020     Torres Anderson MD  06/28/20 [id number]

## 2020-06-28 NOTE — ANESTHESIA CARE TRANSFER NOTE
Patient: Mio Benoit    Procedure(s):  ENDOSCOPIC RETROGRADE CHOLANGIOPANCREATOGRAPHY, pancreatic duct stenting, biliary duct sphincterotomy with stone removal and stent placement    Diagnosis: Cholangitis [K83.09]  Diagnosis Additional Information: No value filed.    Anesthesia Type:   General     Note:  Airway :Nasal Cannula  Patient transferred to:PACU  Comments: Pt. Pink and breathing spontaneously.  Vitals stable.  Report given to oncoming nurse.  Transfer of care occurred. Handoff Report: Identifed the Patient, Identified the Reponsible Provider, Reviewed the pertinent medical history, Discussed the surgical course, Reviewed Intra-OP anesthesia mangement and issues during anesthesia, Set expectations for post-procedure period and Allowed opportunity for questions and acknowledgement of understanding      Vitals: (Last set prior to Anesthesia Care Transfer)    CRNA VITALS  6/28/2020 1202 - 6/28/2020 1244      6/28/2020             Pulse:  112    SpO2:  95 %                Electronically Signed By: GONSALO Lopez CRNA  June 28, 2020  12:44 PM

## 2020-06-28 NOTE — BRIEF OP NOTE
[facility], [place]    Brief Operative Note    Pre-operative diagnosis: Cholangitis [K83.09]  Post-operative diagnosis Same, common duct an gallbladder sludge/stones    Procedure: Procedure(s):  ENDOSCOPIC RETROGRADE CHOLANGIOPANCREATOGRAPHY, pancreatic duct stenting, biliary duct sphincterotomy with stone removal and stent placement  Surgeon: Surgeon(s) and Role:     * Ford Ferrari MD - Primary  Anesthesia: General   Estimated blood loss: None  Drains: None  Specimens: * No specimens in log *  Findings:   Large partially intradiverticular papilla, stenotic  Dual wire access, 4F 9cm PD stent, 1cm biliary sphincterotomy, single floating stone in NONDILATED 6-7mm bile duct. Patent cystic duct, GB filled showing several small stones.sludge. 10F biliary stent for drainage.  Complications: None.  Implants:   Implant Name Type Inv. Item Serial No.  Lot No. LRB No. Used Action   STENT FERRARI PANCREA FLEX [id number] W/O FLANGE SGL PIGTAIL Stent STENT FERRARI PANCREA FLEX [id number] W/O FLANGE SGL PIGTAIL  [place] [id number] N/A 1 Implanted   STENT [employer] ([place]) CHERI SOF-FLEX [id number] Stent STENT COTTON-FLORES ([place]) CHERI SOF-FLEX [id number]  [facility] [id number] N/A 1 Implanted     CBD stone and GG stones/sludge  REC: no anticoagulation  Surgery consult  Jenna this admission if possible, GI will remove biliary stent during that anesthesia to save pt extra procedure.

## 2020-06-29 LAB
ALBUMIN SERPL-MCNC: 2.8 G/DL (ref 3.4–5)
ALP SERPL-CCNC: 125 U/L (ref 40–150)
ALT SERPL W P-5'-P-CCNC: 399 U/L (ref 0–70)
ANION GAP SERPL CALCULATED.3IONS-SCNC: 5 MMOL/L (ref 3–14)
AST SERPL W P-5'-P-CCNC: 222 U/L (ref 0–45)
BASOPHILS # BLD AUTO: 0.1 10E9/L (ref 0–0.2)
BASOPHILS NFR BLD AUTO: 0.6 %
BILIRUB SERPL-MCNC: 5.2 MG/DL (ref 0.2–1.3)
BUN SERPL-MCNC: 11 MG/DL (ref 7–30)
CALCIUM SERPL-MCNC: 8 MG/DL (ref 8.5–10.1)
CHLORIDE SERPL-SCNC: 109 MMOL/L (ref 94–109)
CO2 SERPL-SCNC: 26 MMOL/L (ref 20–32)
CREAT SERPL-MCNC: 1.13 MG/DL (ref 0.66–1.25)
DIFFERENTIAL METHOD BLD: ABNORMAL
EOSINOPHIL # BLD AUTO: 0.2 10E9/L (ref 0–0.7)
EOSINOPHIL NFR BLD AUTO: 2.3 %
ERYTHROCYTE [DISTWIDTH] IN BLOOD BY AUTOMATED COUNT: 13.2 % (ref 10–15)
GFR SERPL CREATININE-BSD FRML MDRD: 73 ML/MIN/{1.73_M2}
GLUCOSE BLDC GLUCOMTR-MCNC: 143 MG/DL (ref 70–99)
GLUCOSE BLDC GLUCOMTR-MCNC: 151 MG/DL (ref 70–99)
GLUCOSE BLDC GLUCOMTR-MCNC: 162 MG/DL (ref 70–99)
GLUCOSE BLDC GLUCOMTR-MCNC: 165 MG/DL (ref 70–99)
GLUCOSE SERPL-MCNC: 165 MG/DL (ref 70–99)
HCT VFR BLD AUTO: 37.1 % (ref 40–53)
HGB BLD-MCNC: 11.8 G/DL (ref 13.3–17.7)
IMM GRANULOCYTES # BLD: 0.1 10E9/L (ref 0–0.4)
IMM GRANULOCYTES NFR BLD: 0.8 %
LIPASE SERPL-CCNC: 210 U/L (ref 73–393)
LYMPHOCYTES # BLD AUTO: 0.4 10E9/L (ref 0.8–5.3)
LYMPHOCYTES NFR BLD AUTO: 3.9 %
MCH RBC QN AUTO: 28.2 PG (ref 26.5–33)
MCHC RBC AUTO-ENTMCNC: 31.8 G/DL (ref 31.5–36.5)
MCV RBC AUTO: 89 FL (ref 78–100)
MONOCYTES # BLD AUTO: 0.6 10E9/L (ref 0–1.3)
MONOCYTES NFR BLD AUTO: 6.2 %
NEUTROPHILS # BLD AUTO: 8.5 10E9/L (ref 1.6–8.3)
NEUTROPHILS NFR BLD AUTO: 86.2 %
NRBC # BLD AUTO: 0 10*3/UL
NRBC BLD AUTO-RTO: 0 /100
PLATELET # BLD AUTO: 278 10E9/L (ref 150–450)
POTASSIUM SERPL-SCNC: 3.4 MMOL/L (ref 3.4–5.3)
PROT SERPL-MCNC: 5.8 G/DL (ref 6.8–8.8)
RBC # BLD AUTO: 4.18 10E12/L (ref 4.4–5.9)
SODIUM SERPL-SCNC: 141 MMOL/L (ref 133–144)
WBC # BLD AUTO: 9.9 10E9/L (ref 4–11)

## 2020-06-29 PROCEDURE — 12000001 ZZH R&B MED SURG/OB UMMC

## 2020-06-29 PROCEDURE — 25000128 H RX IP 250 OP 636: Performed by: STUDENT IN AN ORGANIZED HEALTH CARE EDUCATION/TRAINING PROGRAM

## 2020-06-29 PROCEDURE — 25800030 ZZH RX IP 258 OP 636: Performed by: STUDENT IN AN ORGANIZED HEALTH CARE EDUCATION/TRAINING PROGRAM

## 2020-06-29 PROCEDURE — 99233 SBSQ HOSP IP/OBS HIGH 50: CPT | Mod: GC | Performed by: INTERNAL MEDICINE

## 2020-06-29 PROCEDURE — 85025 COMPLETE CBC W/AUTO DIFF WBC: CPT | Performed by: STUDENT IN AN ORGANIZED HEALTH CARE EDUCATION/TRAINING PROGRAM

## 2020-06-29 PROCEDURE — 25000132 ZZH RX MED GY IP 250 OP 250 PS 637: Performed by: STUDENT IN AN ORGANIZED HEALTH CARE EDUCATION/TRAINING PROGRAM

## 2020-06-29 PROCEDURE — 25000132 ZZH RX MED GY IP 250 OP 250 PS 637: Performed by: PHYSICIAN ASSISTANT

## 2020-06-29 PROCEDURE — 83690 ASSAY OF LIPASE: CPT | Performed by: STUDENT IN AN ORGANIZED HEALTH CARE EDUCATION/TRAINING PROGRAM

## 2020-06-29 PROCEDURE — 00000146 ZZHCL STATISTIC GLUCOSE BY METER IP

## 2020-06-29 PROCEDURE — 80053 COMPREHEN METABOLIC PANEL: CPT | Performed by: STUDENT IN AN ORGANIZED HEALTH CARE EDUCATION/TRAINING PROGRAM

## 2020-06-29 PROCEDURE — 36415 COLL VENOUS BLD VENIPUNCTURE: CPT | Performed by: STUDENT IN AN ORGANIZED HEALTH CARE EDUCATION/TRAINING PROGRAM

## 2020-06-29 PROCEDURE — 87040 BLOOD CULTURE FOR BACTERIA: CPT | Performed by: STUDENT IN AN ORGANIZED HEALTH CARE EDUCATION/TRAINING PROGRAM

## 2020-06-29 RX ORDER — NICOTINE POLACRILEX 4 MG
15-30 LOZENGE BUCCAL
Status: DISCONTINUED | OUTPATIENT
Start: 2020-06-29 | End: 2020-07-01 | Stop reason: HOSPADM

## 2020-06-29 RX ORDER — CEFTRIAXONE 2 G/1
2 INJECTION, POWDER, FOR SOLUTION INTRAMUSCULAR; INTRAVENOUS EVERY 24 HOURS
Status: DISCONTINUED | OUTPATIENT
Start: 2020-06-29 | End: 2020-07-01

## 2020-06-29 RX ORDER — DEXTROSE MONOHYDRATE 25 G/50ML
25-50 INJECTION, SOLUTION INTRAVENOUS
Status: DISCONTINUED | OUTPATIENT
Start: 2020-06-29 | End: 2020-07-01 | Stop reason: HOSPADM

## 2020-06-29 RX ADMIN — CEFEPIME 2 G: 2 INJECTION, POWDER, FOR SOLUTION INTRAVENOUS at 04:21

## 2020-06-29 RX ADMIN — METRONIDAZOLE 500 MG: 500 INJECTION, SOLUTION INTRAVENOUS at 06:06

## 2020-06-29 RX ADMIN — METRONIDAZOLE 500 MG: 500 INJECTION, SOLUTION INTRAVENOUS at 13:26

## 2020-06-29 RX ADMIN — METRONIDAZOLE 500 MG: 500 INJECTION, SOLUTION INTRAVENOUS at 22:04

## 2020-06-29 RX ADMIN — ACETAMINOPHEN 650 MG: 325 TABLET, FILM COATED ORAL at 09:56

## 2020-06-29 RX ADMIN — ACETAMINOPHEN 650 MG: 325 TABLET, FILM COATED ORAL at 06:06

## 2020-06-29 RX ADMIN — CEFEPIME 2 G: 2 INJECTION, POWDER, FOR SOLUTION INTRAVENOUS at 11:57

## 2020-06-29 RX ADMIN — CEFTRIAXONE SODIUM 2 G: 2 INJECTION, POWDER, FOR SOLUTION INTRAMUSCULAR; INTRAVENOUS at 20:09

## 2020-06-29 RX ADMIN — SIMVASTATIN 20 MG: 10 TABLET, FILM COATED ORAL at 22:02

## 2020-06-29 RX ADMIN — SODIUM CHLORIDE, POTASSIUM CHLORIDE, SODIUM LACTATE AND CALCIUM CHLORIDE: 600; 310; 30; 20 INJECTION, SOLUTION INTRAVENOUS at 22:03

## 2020-06-29 ASSESSMENT — ACTIVITIES OF DAILY LIVING (ADL)
ADLS_ACUITY_SCORE: 10
ADLS_ACUITY_SCORE: 12
ADLS_ACUITY_SCORE: 10
ADLS_ACUITY_SCORE: 10

## 2020-06-29 NOTE — PLAN OF CARE
A x O, VSS on RA. Refused capno overnight. Tylenol given for headache x 2 w/ relief. Denies n/v. Clear liquid diet. NPO 0000. BG overnight 192 and 143.  Plan for Gall Bladder removal today. Scrub x 1 given. Receiving IVABX. Voiding spontaneously. SBA. Resting between cares. Continue POC.

## 2020-06-29 NOTE — PROGRESS NOTES
Brief GI note:  Patient s/p ERCP with removal of CBD sludges/stones and placement of 10F biliary stent on 6/28 by Dr. Ferrari.  Continues to do well this morning, reports improvement in his pain since procedure. Exam grossly unremarkable except for icterus.   LFTs today with Tb 5.2 from 3.2 yesterday, ALP,AST,ALT down trending.     Plan:  - Continue with current supportive care  - NPO pass MN, plan for lap balbir tomorrow in OR, will join for biliary stent removal (scheduled)  - Continue holding antithrombotics for two more days  - GI will continue to follow    Amador Cid MD  GI Fellow  #[id number]

## 2020-06-29 NOTE — PLAN OF CARE
"  Hypertensive BPs (within parameters), other VSS. Tylenol given for headache with relief/ +BS. Tolerating clear liquid diet. Denies nausea.LR infusing at 150 ml/hr. Voiding. Pt reporting passing \"black stool\" - hat placed in bathroom and pt encouraged to save. Pt passed another black/greenish stool. MD notified.Plan for lap balbir tomorrow at 15.15 pm. NPO at midnight.  "

## 2020-06-29 NOTE — CONSULTS
"General Surgery Consultation Note    Mio Benoit  MRN: [id number]  male  55 year old    Reason for Consult: Cholecystectomy     Chief Complaint: Abdominal Pain     Admitting Diagnosis:   1. Cholangitis    2. Hepatobiliary tract disease    3. Acute pancreatitis, unspecified complication status, unspecified pancreatitis type        Assessment & Plan:  55 year old male w/ DMII admitted w/ cholangitis s/p ERCP with pancreatic and biliary stent placement. Pain much improved after ERCP.     - Repeat labs in AM including hepatic panel and lipase (ordered)  - NPO at midnight  - Added onto the OR schedule for tomorrow     D/w Dr Murali Maloney MD    HPI:  55 year old male w/ DMII, HLD admitted on 6/28 w/ epigastric abdominal pain found to have gram negative bacteremia and cholangitis vs early pancreatitis s/p ERCP w/ pancreatic duct stenting, biliary duct sphincterotomy w/ stone removal and stent placement. She has received ceftriaxone and flaygl. General surgery consulted for cholecystectomy this admission.     Patient Active Problem List   Diagnosis     Cholangitis       Past Surgical History:  None  Past Medical History: DMII, HLD  Social History: No smoking, occasional alcohol, lives with girlfriend   Family History: No history of bleeding or blood clot     Allergies:   Allergies   Allergen Reactions     Amoxicillin      Active Medications: Reviewed  ROS: Otherwise negative    Physical Examination:   Vital Signs: /73   Pulse 99   Temp 98.3  F (36.8  C)   Resp 17   Ht 1.727 m (5' 8\")   Wt 94.6 kg (208 lb 8 oz)   SpO2 96%   BMI 31.70 kg/m    GEN: alert, well developed, cooperative   Chest: NLB on RA, regular rate  Abdomen: soft, nontender, nondistended  Extremities: WWP, no edema    Labs/Imaging/Other:   BMP wnl  T bili 3.2  Alk phos 155      Lactic acid 1.0  Lipase 998  WBC 16.1     US abdomen  1.  Hyperechoic liver parenchyma which can be seen in setting of  intrinsic " intraparenchymal disease.  2.  Enlarged common bile duct without additional evidence of  cholelithiasis, choledocholithiasis or cholecystitis.     ERCP  Major papilla bulging, edematous, and located partially within a diverticulum   - Stenotic orifice  - Consistent with calculous disease w/ obstructive cholangitis due to small bile duct stone in non-dilated duct

## 2020-06-29 NOTE — PROGRESS NOTES
[facility], [place]    Progress Note - Leila 1 Service        Date of Admission:  6/28/2020    Assessment & Plan   Mio Benoit is a 55 year old male with a history of type 2 diabetes mellitus and hyperlipidemia admitted on 6/28/2020 with epigastric abdominal pain and rigors and was found to have cholangitis and gram negative destiny bacteremia. Plan for cholecystectomy on 6/30.       Sepsis  Cholangitis  Klebsiella bacteremia  Leukocytosis normalized. SIRS criteria now improved. Abdominal ultrasound with enlarged common bile duct. S/p ERCP on 6/28. Now with 1/2 blood cultures positive for gram negative rods. Initially treated with ceftriaxone/metronidazole, but broadened on 6/28 for anti-pseudomonal coverage in the setting of sepsis and bacteremia (avoiding zosyn given amoxicillin allergy). Now given known bacteria, will switch from cefepime to ceftriaxone.   - Ceftriaxone and metronidazole, after source control can switch to oral antibiotics   - Surgery consult for cholecystectomy - plan for cholecystectomy on 6/30     Epigastric pain  Initially described as chest pain, EKG nonischemic and troponin negative. More likely early pancreatitis. Lipase elevated to 998, not quite 3x upper limit of normal. Ultrasound without evidence of pancreatitis. No CT to confirm. Will continue to monitor. Currently resolved.   - Tylenol PRN     Type 2 diabetes mellitus  - Hold home glimepiride and metformin  - Low dose sliding scale insulin  - Hypoglycemia protocol     Hyperlipidemia  - Continue home simvastatin     Diet: Full liquid diet; NPO at midnight   Fluids: LR at 125 mL/hr  DVT Prophylaxis: Pneumatic Compression Devices  Guajardo Catheter: not present  Code Status: Full Code           Disposition Plan   Expected discharge: 2 - 3 days, recommended to prior living arrangement once SIRS/Sepsis treated.  Entered: Rebecca Swenson MD 06/29/2020, 6:01 AM       The patient's care was discussed  with the Attending Physician, Dr. Victor.    MD Leila Telles 1 Service  [facility], [place]  Pager: [phone]  Please see sticky note for cross cover information  ______________________________________________________________________    Interval History   No acute events overnight. No abdominal pain. No chest pain or shortness of breath. No other concerns.     Data reviewed today: I reviewed all medications, new labs and imaging results over the last 24 hours.     Physical Exam   Vital Signs: Temp: 97  F (36.1  C) Temp src: Oral BP: 115/70 Pulse: 99 Heart Rate: 84 Resp: 16 SpO2: 94 % O2 Device: None (Room air) Oxygen Delivery: 1 LPM  Weight: 208 lbs 8 oz  Constitutional: Awake and alert. Comfortable-appearing. Laying in bed.   Eyes: No conjunctival injection.   ENT: Moist mucus membranes.   Respiratory: Breathing comfortably on room air. Clear to auscultation bilaterally.   Cardiovascular: RRR. Normal S1/S2. No murmurs appreciated.   GI: Soft, nontender, nondistended.   Skin: No appreciable jaundice. No rashes or lesions.   Musculoskeletal: No significant lower extremity edema.   Neurologic: Oriented. No facial asymmetry. Moves all extremities appropriately.       Data   Recent Labs   Lab 06/29/20  0724 06/28/20  0442 06/28/20  0432   WBC 9.9  --  16.1*   HGB 11.8*  --  14.6   MCV 89  --  87     --  329   INR  --   --  0.98     --  138   POTASSIUM 3.4  --  3.8   CHLORIDE 109  --  104   CO2 26  --  27   BUN 11  --  14   CR 1.13  --  1.08   ANIONGAP 5  --  7   ROMEO 8.0*  --  9.1   *  --  259*   ALBUMIN 2.8*  --  3.8   PROTTOTAL 5.8*  --  7.6   BILITOTAL 5.2*  --  3.2*   ALKPHOS 125  --  155*   *  --  343*   *  --  494*   LIPASE 210  --  998*   TROPI  --   --  <0.015   TROPONIN  --  0.00  --

## 2020-06-29 NOTE — PROGRESS NOTES
Antimicrobial Stewardship Team Note    Antimicrobial Stewardship Program - A joint venture between [facility] and  Physicians to optimize antibiotic management.  NOT a formal consult - Restricted Antimicrobial Review     Patient: Mio Benoit  MRN: [id number]  Allergies: Amoxicillin    Brief Summary: Mio Benoit is a 55 year old male admitted on 6/28/2020 with epigastric abdominal pain and rigors. His PMH is significant for T2DM and hyperlipidemia      PHI:   Patient presented to the ED with epigastric abdominal and lower chest pain. He stated pain radiates up into the substernal region. Reported having symptoms for the past 3 nights and endorsed nausea followed by vomiting with resolution of symptoms. He stated he has not had a bowel movement in the past 2 days but denied preceding constipation. No history of abdominal surgery. He denied dysuria, urgency, or frequency, leg pain or swelling.  Experienced some mild dyspnea 3 nights ago when he was feeling nauseous but denied any diaphoresis or exertional chest pain. The patient received sublingual nitroglycerin, ondansetron, and aspirin from EMS without change in his symptoms. EKG and troponin were normal with no suspicion for ACS.      On presentation, physical exam was positive for tenderness in the right upper quadrant and epigastric area. Patient was hypertensive (/87), mildly tachycardic (), with leukocytosis WBC 16.1. He was afebrile with all other VS wnl. Patient was initiated on ceftriaxone and metronidazole given elevated lipase at 998 and liver enzymes (, , Alk phos 155, T bili 3.2, repeat 5.2) concerning for biliary obstruction versus ascending cholangitis. Abdominal US significant for hyperechoic liver parenchyma which can be seen in setting of intrinsic intraparenchymal disease and enlarged common bile duct without additional evidence of cholelithiasis, choledocholithiasis or cholecystitis. CXR  unremarkable. Ceftriaxone was broadened  to cefepime on 6/28 for anti-pseudomonal coverage in the setting of sepsis and gram negative destiny bacteremia. Blood culture from 6/28 positive 1/2 Klebsiella pneumoniae with no resistance gene detected on Verigene. Repeat blood culture from 6/29 pending. Patient underwent ERCP on 6/28 with pancreatic duct stenting, biliary duct sphincterotomy with stone removal and stent placement. Plan for laparoscopic cholecystectomy on 6/30.          Active Anti-infective Medications   (From admission, onward)                Start     Stop    06/28/20 2000  ceFEPIme  2 g,   Intravenous,   EVERY 8 HOURS     Sepsis    cholangitis        --    06/28/20 1400  metroNIDAZOLE  500 mg,   Intravenous,   EVERY 8 HOURS     Sepsis    cholangitis        --          Assessment:   Patient with cholangitis complicated by Klebsiella pneumoniae bacteremia on empiric cefepime and metronidazole. Patient is s/p ERCP with plan for laparoscopic cholecystectomy on 6/30/2020. He remains afebrile with all other VS wnl. WBC has normalized to 9.9 today down from 16.1 on admission. Antibiotic therapy was initially broadened  to cefepime to cover for Pseudomonas given GNR bacteremia. Blood culture positive 1/2 for K pneumoniae with no resistance genes detected on Verigene, susceptibility pending. There is no evidence of pseudomonas to warrant therapy with cefepime. Recommend deescalating cefepime to ceftriaxone and switch metronidazole IV to PO as able. Consider further deescalating to ciprofloxacin based on final susceptibility result of K pneumoniae.     Recommendations:  Deescalate cefepime to ceftriaxone 2 g daily   Switch Flagyl IV to oral formulation as able   Consider deescalating to ciprofloxacin 500 mg PO every 12 hours based on susceptibility result     Pharmacy took the following actions: Called/paged provider, Electronic note created.    Discussed with ID Staff: MD Annabelle Castro, PharmD,  [facility]   Pager: [phone]    Vital Signs/Clinical Features:  Vitals         06/27 0700  -  06/28 0659 06/28 0700  -  06/29 0659 06/29 0700  -  06/29 1509   Most Recent    Temp ( F)   98.5    97 -  99.2    96.8 -  97.9     97.9 (36.6)    Pulse 98 -  106    99 -  105      95     95    Heart Rate 99 -  107    84 -  122    83 -  95     95    Resp 17 -  24    14 -  23      16     16    /81 -  149/96    101/64 -  149/78    135/83 -  157/89     155/96    SpO2 (%) 91 -  98    93 -  99    93 -  96     93            Labs  Estimated Creatinine Clearance: 83.4 mL/min (based on SCr of 1.13 mg/dL).  Recent Labs   Lab Test 06/28/20  0432 06/29/20  0724   CR 1.08 1.13       Recent Labs   Lab Test 06/28/20  0432 06/29/20  0724   WBC 16.1* 9.9   ANEU 15.1* 8.5*   ALYM 0.4* 0.4*   HERMAN 0.6 0.6   AEOS 0.0 0.2   HGB 14.6 11.8*   HCT 44.9 37.1*   MCV 87 89    278       Recent Labs   Lab Test 06/28/20  0432 06/29/20  0724   BILITOTAL 3.2* 5.2*   ALKPHOS 155* 125   ALBUMIN 3.8 2.8*   * 222*   * 399*       Recent Labs   Lab Test 06/28/20  0630   LACT 1.0             Culture Results:  7-Day Micro Results       Procedure Component Value Units Date/Time    Blood culture [[id number]] Collected:  06/29/20 0723    Order Status:  Completed Lab Status:  Preliminary result Updated:  06/29/20 1503    Specimen:  Blood      Specimen Description Blood Right Arm     Culture Micro No growth after 7 hours    Blood culture [] Collected:  06/28/20 0603    Order Status:  Completed Lab Status:  Preliminary result Updated:  06/29/20 0716    Specimen:  Blood from Hand, Left      Specimen Description Blood Left Hand     Culture Micro No growth after 1 day    Blood culture []  (Abnormal) Collected:  06/28/20 0541    Order Status:  Completed Lab Status:  Preliminary result Updated:  06/29/20 1300    Specimen:  Blood from Arm, Right      Specimen Description Blood Right Arm     Culture Micro Cultured on the 1st day of  incubation:  Klebsiella pneumoniae  Susceptibility testing in progress        Critical Value/Significant Value, preliminary result only, called to and read back by  NELDA SHAFFER RN 6/28/20 1856 EH        (Note)  POSITIVE for KLEBSIELLA PNEUMONIAE by Verigene multiplex nucleic acid  test. Final identification and antimicrobial susceptibility testing  will be verified by standard methods.    Specimen tested with Verigene multiplex, gram-negative blood culture  nucleic acid test for the following targets: Acinetobacter sp.,  Citrobacter sp., Enterobacter sp., Proteus sp., E. coli, K.  pneumoniae/oxytoca, P. aeruginosa, and the following resistance  markers: CTXM, KPC, NDM, VIM, IMP and OXA.    Critical Value/Significant Value called to and read back by  Adriane Avila RN @ 2215 @ 6/28/20. AV      Blood culture     Order Status:  Canceled Lab Status:  No result     Specimen:  Blood     Blood culture     Order Status:  Canceled Lab Status:  No result     Specimen:  Blood                                     Imaging: Abdomen Us, Limited (ruq Only)    Result Date: 6/28/2020  EXAMINATION: Limited Abdominal Ultrasound, 6/28/2020 6:42 AM COMPARISON: None History: RUQ Pain, evaluate for cholecystitis, RUQ Pain, evaluate for cholecystitis. FINDINGS: Fluid: No evidence of ascites or pleural effusions. Liver: The liver demonstrates mild hyperechoic architecture, measuring 14.5 cm in craniocaudal dimension. There is no focal mass. Gallbladder: There is no wall thickening, pericholecystic fluid, positive sonographic Moore's sign or evidence for cholelithiasis. Bile Ducts: Intrahepatic biliary system appears normal caliber.  The common bile duct measures 9.5 mm in diameter. Pancreas: Visualized portions of the head and body of the pancreas are unremarkable. Kidney: The right kidney measures 12.2 cm long. There is no hydronephrosis or hydroureter, no shadowing renal calculi, cystic lesion or mass.     IMPRESSION: 1.  Hyperechoic  liver parenchyma which can be seen in setting of intrinsic intraparenchymal disease. 2.  Enlarged common bile duct without additional evidence of cholelithiasis, choledocholithiasis or cholecystitis. I have personally reviewed the examination and initial interpretation and I agree with the findings. OUMAR JACQUES MD    Xr Chest Port 1 View    Result Date: 6/28/2020  EXAM: CHEST SINGLE VIEW PORTABLE LOCATION: [facility] DATE/TIME: 06/28/2020, 5:04 AM INDICATION: Chest pain. COMPARISON: None. FINDINGS: The lungs are clear. Normal size cardiac silhouette     IMPRESSION: No evidence of active cardiopulmonary disease.      Xr Surgery Tari Fluoro G/t 5 Min W Stills    Result Date: 6/28/2020  This exam was marked as non-reportable because it will not be read by a radiologist or a [place] non-radiologist provider.

## 2020-06-29 NOTE — PROGRESS NOTES
Surgery Progress Note  06/29/2020       Subjective:  - MIKAL overnight.  - Patient stated that his pain has improved since ERCP  - Patient was consented for Laparoscopic Cholecystectomy     Objective:  Temp:  [96.8  F (36  C)-99.2  F (37.3  C)] 96.8  F (36  C)  Pulse:  [] 99  Heart Rate:  [] 83  Resp:  [14-23] 16  BP: (101-149)/(64-85) 135/83  SpO2:  [94 %-99 %] 95 %    I/O last 3 completed shifts:  In: 3430 [P.O.:120; I.V.:3310]  Out: 1000 [Urine:1000]      Gen: Awake, alert, NAD  Resp: NLB on RA  Abd: soft, nondistended, nontender   Ext: WWP, no edema     Labs:  Recent Labs   Lab 06/29/20  0724 06/28/20  0432   WBC 9.9 16.1*   HGB 11.8* 14.6    329       Recent Labs   Lab 06/29/20  0724 06/28/20  0432    138   POTASSIUM 3.4 3.8   CHLORIDE 109 104   CO2 26 27   BUN 11 14   CR 1.13 1.08   * 259*   ROMEO 8.0* 9.1       Imaging:  EXAMINATION: Limited Abdominal Ultrasound, 6/28/2020 6:42 AM      COMPARISON: None     History: RUQ Pain, evaluate for cholecystitis, RUQ Pain, evaluate for  cholecystitis.      FINDINGS:   Fluid: No evidence of ascites or pleural effusions.     Liver: The liver demonstrates mild hyperechoic architecture, measuring  14.5 cm in craniocaudal dimension. There is no focal mass.      Gallbladder: There is no wall thickening, pericholecystic fluid,  positive sonographic Moore's sign or evidence for cholelithiasis.     Bile Ducts: Intrahepatic biliary system appears normal caliber.  The  common bile duct measures 9.5 mm in diameter.     Pancreas: Visualized portions of the head and body of the pancreas are  unremarkable.      Kidney: The right kidney measures 12.2 cm long. There is no  hydronephrosis or hydroureter, no shadowing renal calculi, cystic  lesion or mass.                                                                       IMPRESSION:   1.  Hyperechoic liver parenchyma which can be seen in setting of  intrinsic intraparenchymal disease.  2.  Enlarged common  bile duct without additional evidence of  cholelithiasis, choledocholithiasis or cholecystitis.      I have personally reviewed the examination and initial interpretation  and I agree with the findings.        Assessment/Plan:   55 year old male w/ DMII, HLD admitted on 6/28 w/ epigastric abdominal pain found to have gram negative bacteremia and cholangitis vs early pancreatitis s/p ERCP w/ pancreatic duct stenting, biliary duct sphincterotomy w/ stone removal and stent placement. She has received ceftriaxone and flaygl.    - Continue with Current management as per primary team  - OR tomorrow for Laparoscopic Cholecystectomy @ 1515pm  - NPO @ midnight tonight     Seen, examined, and discussed with chief resident, who will discuss with staff.  - - - - - - - - - - - - - - - - - -  Ankit Dias MD  General Surgery PGY-1

## 2020-06-30 ENCOUNTER — ANESTHESIA EVENT (OUTPATIENT)
Dept: SURGERY | Facility: CLINIC | Age: 55
DRG: 854 | End: 2020-06-30
Payer: COMMERCIAL

## 2020-06-30 ENCOUNTER — ANESTHESIA (OUTPATIENT)
Dept: SURGERY | Facility: CLINIC | Age: 55
DRG: 854 | End: 2020-06-30
Payer: COMMERCIAL

## 2020-06-30 ENCOUNTER — APPOINTMENT (OUTPATIENT)
Dept: GENERAL RADIOLOGY | Facility: CLINIC | Age: 55
DRG: 854 | End: 2020-06-30
Attending: PEDIATRICS
Payer: COMMERCIAL

## 2020-06-30 ENCOUNTER — ANCILLARY PROCEDURE (OUTPATIENT)
Dept: ULTRASOUND IMAGING | Facility: CLINIC | Age: 55
End: 2020-06-30
Payer: COMMERCIAL

## 2020-06-30 LAB
ABO + RH BLD: NORMAL
ABO + RH BLD: NORMAL
ALBUMIN SERPL-MCNC: 2.7 G/DL (ref 3.4–5)
ALP SERPL-CCNC: 122 U/L (ref 40–150)
ALT SERPL W P-5'-P-CCNC: 269 U/L (ref 0–70)
ANION GAP SERPL CALCULATED.3IONS-SCNC: 7 MMOL/L (ref 3–14)
AST SERPL W P-5'-P-CCNC: 81 U/L (ref 0–45)
BACTERIA SPEC CULT: ABNORMAL
BASOPHILS # BLD AUTO: 0.1 10E9/L (ref 0–0.2)
BASOPHILS NFR BLD AUTO: 0.9 %
BILIRUB SERPL-MCNC: 1.7 MG/DL (ref 0.2–1.3)
BLD GP AB SCN SERPL QL: NORMAL
BLOOD BANK CMNT PATIENT-IMP: NORMAL
BUN SERPL-MCNC: 31 MG/DL (ref 7–30)
CALCIUM SERPL-MCNC: 8.3 MG/DL (ref 8.5–10.1)
CHLORIDE SERPL-SCNC: 111 MMOL/L (ref 94–109)
CO2 SERPL-SCNC: 22 MMOL/L (ref 20–32)
CREAT SERPL-MCNC: 1.03 MG/DL (ref 0.66–1.25)
DIFFERENTIAL METHOD BLD: ABNORMAL
EOSINOPHIL # BLD AUTO: 0.3 10E9/L (ref 0–0.7)
EOSINOPHIL NFR BLD AUTO: 3.9 %
ERCP: NORMAL
ERCP: NORMAL
ERYTHROCYTE [DISTWIDTH] IN BLOOD BY AUTOMATED COUNT: 13 % (ref 10–15)
ERYTHROCYTE [DISTWIDTH] IN BLOOD BY AUTOMATED COUNT: 13.2 % (ref 10–15)
GFR SERPL CREATININE-BSD FRML MDRD: 82 ML/MIN/{1.73_M2}
GLUCOSE BLDC GLUCOMTR-MCNC: 158 MG/DL (ref 70–99)
GLUCOSE BLDC GLUCOMTR-MCNC: 164 MG/DL (ref 70–99)
GLUCOSE BLDC GLUCOMTR-MCNC: 179 MG/DL (ref 70–99)
GLUCOSE BLDC GLUCOMTR-MCNC: 194 MG/DL (ref 70–99)
GLUCOSE SERPL-MCNC: 172 MG/DL (ref 70–99)
HCT VFR BLD AUTO: 35.8 % (ref 40–53)
HCT VFR BLD AUTO: 36.8 % (ref 40–53)
HGB BLD-MCNC: 10.2 G/DL (ref 13.3–17.7)
HGB BLD-MCNC: 11.4 G/DL (ref 13.3–17.7)
HGB BLD-MCNC: 11.7 G/DL (ref 13.3–17.7)
IMM GRANULOCYTES # BLD: 0.1 10E9/L (ref 0–0.4)
IMM GRANULOCYTES NFR BLD: 0.8 %
INR PPP: 0.98 (ref 0.86–1.14)
LYMPHOCYTES # BLD AUTO: 0.5 10E9/L (ref 0.8–5.3)
LYMPHOCYTES NFR BLD AUTO: 8 %
MCH RBC QN AUTO: 28.1 PG (ref 26.5–33)
MCH RBC QN AUTO: 28.4 PG (ref 26.5–33)
MCHC RBC AUTO-ENTMCNC: 31.8 G/DL (ref 31.5–36.5)
MCHC RBC AUTO-ENTMCNC: 31.8 G/DL (ref 31.5–36.5)
MCV RBC AUTO: 88 FL (ref 78–100)
MCV RBC AUTO: 89 FL (ref 78–100)
MONOCYTES # BLD AUTO: 0.6 10E9/L (ref 0–1.3)
MONOCYTES NFR BLD AUTO: 9.6 %
NEUTROPHILS # BLD AUTO: 5 10E9/L (ref 1.6–8.3)
NEUTROPHILS NFR BLD AUTO: 76.8 %
NRBC # BLD AUTO: 0 10*3/UL
NRBC BLD AUTO-RTO: 0 /100
PLATELET # BLD AUTO: 271 10E9/L (ref 150–450)
PLATELET # BLD AUTO: 287 10E9/L (ref 150–450)
POTASSIUM SERPL-SCNC: 3.4 MMOL/L (ref 3.4–5.3)
PROT SERPL-MCNC: 5.8 G/DL (ref 6.8–8.8)
RBC # BLD AUTO: 4.02 10E12/L (ref 4.4–5.9)
RBC # BLD AUTO: 4.17 10E12/L (ref 4.4–5.9)
SODIUM SERPL-SCNC: 140 MMOL/L (ref 133–144)
SPECIMEN EXP DATE BLD: NORMAL
SPECIMEN SOURCE: ABNORMAL
WBC # BLD AUTO: 12.1 10E9/L (ref 4–11)
WBC # BLD AUTO: 6.5 10E9/L (ref 4–11)

## 2020-06-30 PROCEDURE — 36000062 ZZH SURGERY LEVEL 4 1ST 30 MIN - UMMC: Performed by: SURGERY

## 2020-06-30 PROCEDURE — 0FT44ZZ RESECTION OF GALLBLADDER, PERCUTANEOUS ENDOSCOPIC APPROACH: ICD-10-PCS | Performed by: SURGERY

## 2020-06-30 PROCEDURE — 36415 COLL VENOUS BLD VENIPUNCTURE: CPT

## 2020-06-30 PROCEDURE — 36415 COLL VENOUS BLD VENIPUNCTURE: CPT | Performed by: STUDENT IN AN ORGANIZED HEALTH CARE EDUCATION/TRAINING PROGRAM

## 2020-06-30 PROCEDURE — 25000128 H RX IP 250 OP 636: Performed by: STUDENT IN AN ORGANIZED HEALTH CARE EDUCATION/TRAINING PROGRAM

## 2020-06-30 PROCEDURE — 25000125 ZZHC RX 250: Performed by: ANESTHESIOLOGY

## 2020-06-30 PROCEDURE — 25000128 H RX IP 250 OP 636: Performed by: NURSE ANESTHETIST, CERTIFIED REGISTERED

## 2020-06-30 PROCEDURE — 12000001 ZZH R&B MED SURG/OB UMMC

## 2020-06-30 PROCEDURE — 85018 HEMOGLOBIN: CPT

## 2020-06-30 PROCEDURE — 85027 COMPLETE CBC AUTOMATED: CPT | Performed by: STUDENT IN AN ORGANIZED HEALTH CARE EDUCATION/TRAINING PROGRAM

## 2020-06-30 PROCEDURE — 25000128 H RX IP 250 OP 636: Performed by: SURGERY

## 2020-06-30 PROCEDURE — 36000064 ZZH SURGERY LEVEL 4 EA 15 ADDTL MIN - UMMC: Performed by: SURGERY

## 2020-06-30 PROCEDURE — 25000128 H RX IP 250 OP 636: Performed by: ANESTHESIOLOGY

## 2020-06-30 PROCEDURE — 86850 RBC ANTIBODY SCREEN: CPT | Performed by: INTERNAL MEDICINE

## 2020-06-30 PROCEDURE — 25000565 ZZH ISOFLURANE, EA 15 MIN: Performed by: SURGERY

## 2020-06-30 PROCEDURE — 37000008 ZZH ANESTHESIA TECHNICAL FEE, 1ST 30 MIN: Performed by: SURGERY

## 2020-06-30 PROCEDURE — 0FC98ZZ EXTIRPATION OF MATTER FROM COMMON BILE DUCT, VIA NATURAL OR ARTIFICIAL OPENING ENDOSCOPIC: ICD-10-PCS | Performed by: INTERNAL MEDICINE

## 2020-06-30 PROCEDURE — 71000015 ZZH RECOVERY PHASE 1 LEVEL 2 EA ADDTL HR: Performed by: SURGERY

## 2020-06-30 PROCEDURE — 25000125 ZZHC RX 250: Performed by: SURGERY

## 2020-06-30 PROCEDURE — 99207 ZZC CDG-MDM COMPONENT: MEETS LOW - DOWN CODED: CPT | Performed by: PEDIATRICS

## 2020-06-30 PROCEDURE — 25000132 ZZH RX MED GY IP 250 OP 250 PS 637: Performed by: INTERNAL MEDICINE

## 2020-06-30 PROCEDURE — 25000132 ZZH RX MED GY IP 250 OP 250 PS 637: Performed by: STUDENT IN AN ORGANIZED HEALTH CARE EDUCATION/TRAINING PROGRAM

## 2020-06-30 PROCEDURE — 88304 TISSUE EXAM BY PATHOLOGIST: CPT | Performed by: SURGERY

## 2020-06-30 PROCEDURE — 85610 PROTHROMBIN TIME: CPT | Performed by: STUDENT IN AN ORGANIZED HEALTH CARE EDUCATION/TRAINING PROGRAM

## 2020-06-30 PROCEDURE — 25000125 ZZHC RX 250: Performed by: NURSE ANESTHETIST, CERTIFIED REGISTERED

## 2020-06-30 PROCEDURE — 25800030 ZZH RX IP 258 OP 636: Performed by: ANESTHESIOLOGY

## 2020-06-30 PROCEDURE — 86900 BLOOD TYPING SEROLOGIC ABO: CPT | Performed by: INTERNAL MEDICINE

## 2020-06-30 PROCEDURE — 40000277 XR SURGERY CARM FLUORO LESS THAN 5 MIN W STILLS: Mod: TC

## 2020-06-30 PROCEDURE — 87040 BLOOD CULTURE FOR BACTERIA: CPT | Performed by: STUDENT IN AN ORGANIZED HEALTH CARE EDUCATION/TRAINING PROGRAM

## 2020-06-30 PROCEDURE — 25000128 H RX IP 250 OP 636

## 2020-06-30 PROCEDURE — 0FPB8DZ REMOVAL OF INTRALUMINAL DEVICE FROM HEPATOBILIARY DUCT, VIA NATURAL OR ARTIFICIAL OPENING ENDOSCOPIC: ICD-10-PCS | Performed by: INTERNAL MEDICINE

## 2020-06-30 PROCEDURE — 00000146 ZZHCL STATISTIC GLUCOSE BY METER IP

## 2020-06-30 PROCEDURE — C1877 STENT, NON-COAT/COV W/O DEL: HCPCS | Performed by: SURGERY

## 2020-06-30 PROCEDURE — 86901 BLOOD TYPING SEROLOGIC RH(D): CPT | Performed by: INTERNAL MEDICINE

## 2020-06-30 PROCEDURE — 37000009 ZZH ANESTHESIA TECHNICAL FEE, EACH ADDTL 15 MIN: Performed by: SURGERY

## 2020-06-30 PROCEDURE — 40000170 ZZH STATISTIC PRE-PROCEDURE ASSESSMENT II: Performed by: SURGERY

## 2020-06-30 PROCEDURE — C1769 GUIDE WIRE: HCPCS | Performed by: SURGERY

## 2020-06-30 PROCEDURE — 85025 COMPLETE CBC W/AUTO DIFF WBC: CPT | Performed by: STUDENT IN AN ORGANIZED HEALTH CARE EDUCATION/TRAINING PROGRAM

## 2020-06-30 PROCEDURE — 0F7D8DZ DILATION OF PANCREATIC DUCT WITH INTRALUMINAL DEVICE, VIA NATURAL OR ARTIFICIAL OPENING ENDOSCOPIC: ICD-10-PCS | Performed by: INTERNAL MEDICINE

## 2020-06-30 PROCEDURE — 27211024 ZZHC OR SUPPLY OTHER OPNP: Performed by: SURGERY

## 2020-06-30 PROCEDURE — 71000014 ZZH RECOVERY PHASE 1 LEVEL 2 FIRST HR: Performed by: SURGERY

## 2020-06-30 PROCEDURE — 25500064 ZZH RX 255 OP 636: Performed by: SURGERY

## 2020-06-30 PROCEDURE — 25800030 ZZH RX IP 258 OP 636: Performed by: NURSE ANESTHETIST, CERTIFIED REGISTERED

## 2020-06-30 PROCEDURE — 99232 SBSQ HOSP IP/OBS MODERATE 35: CPT | Mod: GC | Performed by: PEDIATRICS

## 2020-06-30 PROCEDURE — 25000125 ZZHC RX 250: Performed by: INTERNAL MEDICINE

## 2020-06-30 PROCEDURE — 25000566 ZZH SEVOFLURANE, EA 15 MIN: Performed by: SURGERY

## 2020-06-30 PROCEDURE — 25800030 ZZH RX IP 258 OP 636: Performed by: STUDENT IN AN ORGANIZED HEALTH CARE EDUCATION/TRAINING PROGRAM

## 2020-06-30 PROCEDURE — 27210794 ZZH OR GENERAL SUPPLY STERILE: Performed by: SURGERY

## 2020-06-30 PROCEDURE — 80053 COMPREHEN METABOLIC PANEL: CPT | Performed by: STUDENT IN AN ORGANIZED HEALTH CARE EDUCATION/TRAINING PROGRAM

## 2020-06-30 RX ORDER — ONDANSETRON 2 MG/ML
4 INJECTION INTRAMUSCULAR; INTRAVENOUS EVERY 30 MIN PRN
Status: DISCONTINUED | OUTPATIENT
Start: 2020-06-30 | End: 2020-06-30 | Stop reason: HOSPADM

## 2020-06-30 RX ORDER — SODIUM CHLORIDE, SODIUM LACTATE, POTASSIUM CHLORIDE, CALCIUM CHLORIDE 600; 310; 30; 20 MG/100ML; MG/100ML; MG/100ML; MG/100ML
INJECTION, SOLUTION INTRAVENOUS CONTINUOUS
Status: DISCONTINUED | OUTPATIENT
Start: 2020-06-30 | End: 2020-06-30 | Stop reason: HOSPADM

## 2020-06-30 RX ORDER — FENTANYL CITRATE 50 UG/ML
25-50 INJECTION, SOLUTION INTRAMUSCULAR; INTRAVENOUS
Status: DISCONTINUED | OUTPATIENT
Start: 2020-06-30 | End: 2020-06-30 | Stop reason: HOSPADM

## 2020-06-30 RX ORDER — NALOXONE HYDROCHLORIDE 0.4 MG/ML
.1-.4 INJECTION, SOLUTION INTRAMUSCULAR; INTRAVENOUS; SUBCUTANEOUS
Status: DISCONTINUED | OUTPATIENT
Start: 2020-06-30 | End: 2020-06-30 | Stop reason: HOSPADM

## 2020-06-30 RX ORDER — DEXAMETHASONE SODIUM PHOSPHATE 10 MG/ML
INJECTION, SOLUTION INTRAMUSCULAR; INTRAVENOUS PRN
Status: DISCONTINUED | OUTPATIENT
Start: 2020-06-30 | End: 2020-06-30

## 2020-06-30 RX ORDER — LIDOCAINE HYDROCHLORIDE 20 MG/ML
INJECTION, SOLUTION INFILTRATION; PERINEURAL PRN
Status: DISCONTINUED | OUTPATIENT
Start: 2020-06-30 | End: 2020-06-30

## 2020-06-30 RX ORDER — DEXAMETHASONE SODIUM PHOSPHATE 4 MG/ML
INJECTION, SOLUTION INTRA-ARTICULAR; INTRALESIONAL; INTRAMUSCULAR; INTRAVENOUS; SOFT TISSUE PRN
Status: DISCONTINUED | OUTPATIENT
Start: 2020-06-30 | End: 2020-06-30

## 2020-06-30 RX ORDER — EPINEPHRINE IN SOD CHLOR,ISO 1 MG/10 ML
SYRINGE (ML) INTRAVENOUS PRN
Status: DISCONTINUED | OUTPATIENT
Start: 2020-06-30 | End: 2020-06-30 | Stop reason: HOSPADM

## 2020-06-30 RX ORDER — INDOMETHACIN 50 MG/1
SUPPOSITORY RECTAL PRN
Status: DISCONTINUED | OUTPATIENT
Start: 2020-06-30 | End: 2020-06-30 | Stop reason: HOSPADM

## 2020-06-30 RX ORDER — ONDANSETRON 2 MG/ML
INJECTION INTRAMUSCULAR; INTRAVENOUS PRN
Status: DISCONTINUED | OUTPATIENT
Start: 2020-06-30 | End: 2020-06-30

## 2020-06-30 RX ORDER — NALOXONE HYDROCHLORIDE 0.4 MG/ML
.1-.4 INJECTION, SOLUTION INTRAMUSCULAR; INTRAVENOUS; SUBCUTANEOUS
Status: ACTIVE | OUTPATIENT
Start: 2020-06-30 | End: 2020-07-01

## 2020-06-30 RX ORDER — OXYCODONE HYDROCHLORIDE 5 MG/1
5 TABLET ORAL EVERY 4 HOURS PRN
Status: DISCONTINUED | OUTPATIENT
Start: 2020-06-30 | End: 2020-07-01 | Stop reason: HOSPADM

## 2020-06-30 RX ORDER — ONDANSETRON 4 MG/1
4 TABLET, ORALLY DISINTEGRATING ORAL EVERY 30 MIN PRN
Status: DISCONTINUED | OUTPATIENT
Start: 2020-06-30 | End: 2020-06-30 | Stop reason: HOSPADM

## 2020-06-30 RX ORDER — CEFAZOLIN SODIUM 1 G/3ML
1 INJECTION, POWDER, FOR SOLUTION INTRAMUSCULAR; INTRAVENOUS SEE ADMIN INSTRUCTIONS
Status: DISCONTINUED | OUTPATIENT
Start: 2020-06-30 | End: 2020-06-30 | Stop reason: HOSPADM

## 2020-06-30 RX ORDER — HYDROMORPHONE HYDROCHLORIDE 1 MG/ML
.3-.5 INJECTION, SOLUTION INTRAMUSCULAR; INTRAVENOUS; SUBCUTANEOUS EVERY 5 MIN PRN
Status: DISCONTINUED | OUTPATIENT
Start: 2020-06-30 | End: 2020-06-30 | Stop reason: HOSPADM

## 2020-06-30 RX ORDER — BUPIVACAINE HYDROCHLORIDE 2.5 MG/ML
INJECTION, SOLUTION EPIDURAL; INFILTRATION; INTRACAUDAL PRN
Status: DISCONTINUED | OUTPATIENT
Start: 2020-06-30 | End: 2020-06-30

## 2020-06-30 RX ORDER — CEFAZOLIN SODIUM 2 G/100ML
2 INJECTION, SOLUTION INTRAVENOUS
Status: DISCONTINUED | OUTPATIENT
Start: 2020-06-30 | End: 2020-06-30 | Stop reason: HOSPADM

## 2020-06-30 RX ORDER — SODIUM CHLORIDE 9 MG/ML
INJECTION, SOLUTION INTRAVENOUS CONTINUOUS PRN
Status: DISCONTINUED | OUTPATIENT
Start: 2020-06-30 | End: 2020-06-30

## 2020-06-30 RX ORDER — FENTANYL CITRATE 50 UG/ML
INJECTION, SOLUTION INTRAMUSCULAR; INTRAVENOUS PRN
Status: DISCONTINUED | OUTPATIENT
Start: 2020-06-30 | End: 2020-06-30

## 2020-06-30 RX ORDER — FLUMAZENIL 0.1 MG/ML
0.2 INJECTION, SOLUTION INTRAVENOUS
Status: DISCONTINUED | OUTPATIENT
Start: 2020-06-30 | End: 2020-06-30 | Stop reason: HOSPADM

## 2020-06-30 RX ORDER — PROPOFOL 10 MG/ML
INJECTION, EMULSION INTRAVENOUS PRN
Status: DISCONTINUED | OUTPATIENT
Start: 2020-06-30 | End: 2020-06-30

## 2020-06-30 RX ADMIN — DEXAMETHASONE SODIUM PHOSPHATE 4 MG: 4 INJECTION, SOLUTION INTRA-ARTICULAR; INTRALESIONAL; INTRAMUSCULAR; INTRAVENOUS; SOFT TISSUE at 08:51

## 2020-06-30 RX ADMIN — SODIUM CHLORIDE, POTASSIUM CHLORIDE, SODIUM LACTATE AND CALCIUM CHLORIDE: 600; 310; 30; 20 INJECTION, SOLUTION INTRAVENOUS at 11:38

## 2020-06-30 RX ADMIN — SODIUM CHLORIDE, POTASSIUM CHLORIDE, SODIUM LACTATE AND CALCIUM CHLORIDE: 600; 310; 30; 20 INJECTION, SOLUTION INTRAVENOUS at 20:49

## 2020-06-30 RX ADMIN — PHENYLEPHRINE HYDROCHLORIDE 200 MCG: 10 INJECTION INTRAVENOUS at 12:06

## 2020-06-30 RX ADMIN — GLUCAGON HYDROCHLORIDE 0.4 MG: KIT at 12:19

## 2020-06-30 RX ADMIN — SIMVASTATIN 20 MG: 10 TABLET, FILM COATED ORAL at 22:22

## 2020-06-30 RX ADMIN — SODIUM CHLORIDE, POTASSIUM CHLORIDE, SODIUM LACTATE AND CALCIUM CHLORIDE: 600; 310; 30; 20 INJECTION, SOLUTION INTRAVENOUS at 09:11

## 2020-06-30 RX ADMIN — PHENYLEPHRINE HYDROCHLORIDE 100 MCG: 10 INJECTION INTRAVENOUS at 09:06

## 2020-06-30 RX ADMIN — PHENYLEPHRINE HYDROCHLORIDE 100 MCG: 10 INJECTION INTRAVENOUS at 12:12

## 2020-06-30 RX ADMIN — SUGAMMADEX 200 MG: 100 INJECTION, SOLUTION INTRAVENOUS at 12:32

## 2020-06-30 RX ADMIN — FENTANYL CITRATE 100 MCG: 50 INJECTION, SOLUTION INTRAMUSCULAR; INTRAVENOUS at 08:38

## 2020-06-30 RX ADMIN — FENTANYL CITRATE 50 MCG: 50 INJECTION, SOLUTION INTRAMUSCULAR; INTRAVENOUS at 11:52

## 2020-06-30 RX ADMIN — LIDOCAINE HYDROCHLORIDE 100 MG: 20 INJECTION, SOLUTION INFILTRATION; PERINEURAL at 08:38

## 2020-06-30 RX ADMIN — PHENYLEPHRINE HYDROCHLORIDE 100 MCG: 10 INJECTION INTRAVENOUS at 09:09

## 2020-06-30 RX ADMIN — FENTANYL CITRATE 50 MCG: 50 INJECTION, SOLUTION INTRAMUSCULAR; INTRAVENOUS at 08:17

## 2020-06-30 RX ADMIN — OXYCODONE HYDROCHLORIDE 5 MG: 5 TABLET ORAL at 19:18

## 2020-06-30 RX ADMIN — BUPIVACAINE HYDROCHLORIDE 60 ML: 2.5 INJECTION, SOLUTION EPIDURAL; INFILTRATION; INTRACAUDAL; PERINEURAL at 08:11

## 2020-06-30 RX ADMIN — PHENYLEPHRINE HYDROCHLORIDE 100 MCG: 10 INJECTION INTRAVENOUS at 08:56

## 2020-06-30 RX ADMIN — MIDAZOLAM 1 MG: 1 INJECTION INTRAMUSCULAR; INTRAVENOUS at 08:28

## 2020-06-30 RX ADMIN — ROCURONIUM BROMIDE 20 MG: 10 INJECTION INTRAVENOUS at 09:31

## 2020-06-30 RX ADMIN — PHENYLEPHRINE HYDROCHLORIDE 100 MCG: 10 INJECTION INTRAVENOUS at 08:51

## 2020-06-30 RX ADMIN — ONDANSETRON 4 MG: 2 INJECTION INTRAMUSCULAR; INTRAVENOUS at 12:19

## 2020-06-30 RX ADMIN — METRONIDAZOLE 500 MG: 500 INJECTION, SOLUTION INTRAVENOUS at 06:20

## 2020-06-30 RX ADMIN — FENTANYL CITRATE 50 MCG: 50 INJECTION, SOLUTION INTRAMUSCULAR; INTRAVENOUS at 09:20

## 2020-06-30 RX ADMIN — PROPOFOL 60 MG: 10 INJECTION, EMULSION INTRAVENOUS at 11:31

## 2020-06-30 RX ADMIN — OMEPRAZOLE 20 MG: 20 CAPSULE, DELAYED RELEASE ORAL at 17:56

## 2020-06-30 RX ADMIN — FENTANYL CITRATE 50 MCG: 50 INJECTION, SOLUTION INTRAMUSCULAR; INTRAVENOUS at 09:28

## 2020-06-30 RX ADMIN — PHENYLEPHRINE HYDROCHLORIDE 100 MCG: 10 INJECTION INTRAVENOUS at 08:44

## 2020-06-30 RX ADMIN — GLUCAGON HYDROCHLORIDE 0.4 MG: KIT at 11:51

## 2020-06-30 RX ADMIN — PROPOFOL 200 MG: 10 INJECTION, EMULSION INTRAVENOUS at 08:38

## 2020-06-30 RX ADMIN — CEFTRIAXONE SODIUM 2 G: 2 INJECTION, POWDER, FOR SOLUTION INTRAMUSCULAR; INTRAVENOUS at 20:47

## 2020-06-30 RX ADMIN — ROCURONIUM BROMIDE 50 MG: 10 INJECTION INTRAVENOUS at 08:39

## 2020-06-30 RX ADMIN — DEXAMETHASONE SODIUM PHOSPHATE 2 MG: 10 INJECTION, SOLUTION INTRAMUSCULAR; INTRAVENOUS at 08:11

## 2020-06-30 RX ADMIN — MIDAZOLAM 1 MG: 1 INJECTION INTRAMUSCULAR; INTRAVENOUS at 08:17

## 2020-06-30 RX ADMIN — METRONIDAZOLE 500 MG: 500 INJECTION, SOLUTION INTRAVENOUS at 18:02

## 2020-06-30 RX ADMIN — SODIUM CHLORIDE: 900 INJECTION INTRAVENOUS at 08:28

## 2020-06-30 RX ADMIN — ROCURONIUM BROMIDE 20 MG: 10 INJECTION INTRAVENOUS at 10:12

## 2020-06-30 RX ADMIN — PHENYLEPHRINE HYDROCHLORIDE 100 MCG: 10 INJECTION INTRAVENOUS at 12:24

## 2020-06-30 RX ADMIN — DEXMEDETOMIDINE HYDROCHLORIDE 20 MCG: 100 INJECTION, SOLUTION INTRAVENOUS at 08:11

## 2020-06-30 RX ADMIN — PHENYLEPHRINE HYDROCHLORIDE 200 MCG: 10 INJECTION INTRAVENOUS at 09:00

## 2020-06-30 RX ADMIN — PHENYLEPHRINE HYDROCHLORIDE 100 MCG: 10 INJECTION INTRAVENOUS at 10:21

## 2020-06-30 RX ADMIN — PHENYLEPHRINE HYDROCHLORIDE 200 MCG: 10 INJECTION INTRAVENOUS at 09:11

## 2020-06-30 ASSESSMENT — PAIN DESCRIPTION - DESCRIPTORS: DESCRIPTORS: SORE

## 2020-06-30 ASSESSMENT — ACTIVITIES OF DAILY LIVING (ADL)
ADLS_ACUITY_SCORE: 12

## 2020-06-30 NOTE — BRIEF OP NOTE
[facility], [place]    Brief Operative Note    Pre-operative diagnosis: Cholangitis [K83.09]  Post-operative diagnosis Acute cholecystitis    Procedure: Procedure(s):  CHOLECYSTECTOMY, LAPAROSCOPIC  CHOLECYSTECTOMY, OPEN  Laparoscopic transgastric endoscopic retrograde cholangiopancreatography (ERCP)  Surgeon: Surgeon(s) and Role:     * Renzo Carter MD - Primary     * Nik Youngblood MD - Assisting     * Fabi Thomas DO - Resident - Assisting     * Ford Ferrari MD  Anesthesia: Combined General with Block   Estimated blood loss: 200 ml  Drains: None  Specimens:   ID Type Source Tests Collected by Time Destination   A : Gallbladder Organ Gallbladder and Contents SURGICAL PATHOLOGY EXAM Renzo Carter MD 6/30/2020 10:44 AM      Findings:   thick and inflamed gallbladder.  Complications: Unanticipated bleeding from cystic artery which was controlled with clips. No PRBC transfusion needed.  Implants: * No implants in log *    Fabi Thomas DO  General Surgery, PGY2

## 2020-06-30 NOTE — ANESTHESIA CARE TRANSFER NOTE
Patient: Mio Benoit    Procedure(s):  CHOLECYSTECTOMY, LAPAROSCOPIC  endoscopic retrograde cholangiopancreatography (ERCP) with biliary stent removal, stone removal, pancreatic stent placement, cauterization and clipping of bleeding vessel.    Diagnosis: Cholangitis [K83.09]  Diagnosis Additional Information: No value filed.    Anesthesia Type:   General     Note:  Airway :Face Mask  Patient transferred to:PACU  Comments: Pt drowsy but arousable, breathing spontaneously on 6L O2 via FM. VSS. Report shared with RN.Handoff Report: Identifed the Patient, Identified the Reponsible Provider, Reviewed the pertinent medical history, Discussed the surgical course, Reviewed Intra-OP anesthesia mangement and issues during anesthesia, Set expectations for post-procedure period and Allowed opportunity for questions and acknowledgement of understanding      Vitals: (Last set prior to Anesthesia Care Transfer)    CRNA VITALS  6/30/2020 1227 - 6/30/2020 1302      6/30/2020             NIBP:  135/87    NIBP Mean:  104    Ht Rate:  104    SpO2:  100 %    EKG:  Sinus rhythm                Electronically Signed By: GONSALO Swenson CRNA  June 30, 2020  1:02 PM

## 2020-06-30 NOTE — PLAN OF CARE
Alert and oriented. Denies pain when asked. Denies nausea but no appetite. Diet advance to full, but resident did not eat much as he verbalized that he has no appetite. UAL, steady. MIVF at 125 ml/hr. On IV abx via PIV. Pre op scrub done. Still needed one more tomorrow prior to scheduled procedure. OVSS, afebrile. Blood sugar monitoring with sliding scale insulin given per protocol.  PLAN: Continue with the care plan. NPO post midnight. Lap balbir at 1500H tomorrow.

## 2020-06-30 NOTE — ANESTHESIA PROCEDURE NOTES
Peripheral Nerve Block Procedure Note  Staff -   Anesthesiologist:  Pretty Larios MD  Resident/Fellow: Khurram Denis MD    Performed By: resident        Location: Pre-op  Procedure Start/Stop TImes:      6/30/2020 8:04 AM     6/30/2020 8:14 AM    patient identified, IV checked, site marked, risks and benefits discussed, informed consent, monitors and equipment checked, pre-op evaluation, at physician/surgeon's request and post-op pain management      Correct Patient: Yes      Correct Position: Yes      Correct Site: Yes      Correct Procedure: Yes      Correct Laterality:  Yes    Site Marked:  Yes  Procedure details:     Procedure:  TAP    Diagnosis:  Post operative pain    Laterality:  Bilateral    Position:  Supine    Sterile Prep: chloraprep, mask and sterile gloves      Local skin infiltration:  None    Needle:  Short bevel    Needle gauge:  21    Needle length (mm):  110    Ultrasound: Yes      Ultrasound used to identify targeted nerve, plexus, or vascular structure and placed a needle adjacent to it      Permanent Image entered into patiient's record      Abnormal pain on injection: No      Blood Aspirated: No      Paresthesias:  No    Bleeding at site: No      Bolus via:  Needle    Infusion Method:  Single Shot    Complications:  None  Assessment/Narrative:     Injection made incrementally with aspirations every (mL):  5

## 2020-06-30 NOTE — PROGRESS NOTES
"Post Op Check    06/30/2020    Mio Benoit is a 55 year old male with h/o Cholangitis now POD#0 s/p Laparoscopic Cholecystecomy.    Pt reports feeling good. Denies SOB, chest pain, or dizziness. Patient had BM and Flatus Voiding Freely.    BP (!) 141/89 (BP Location: Right arm)   Pulse 91   Temp 98.3  F (36.8  C) (Oral)   Resp 17   Ht 1.727 m (5' 8\")   Wt 94.6 kg (208 lb 8 oz)   SpO2 94%   BMI 31.70 kg/m      Gen: A&O x3, NAD, Pleasant, Able to answer questions  Chest: breathing non-labored  Abdomen: soft, non-tender, non-distended  Incision: clean, dry, intact  Extremities: warm and well perfused    A/P: No acute post-op issues. Continue plan of care per primary team. Please call with any questions.    Ankit Dias MD, PGY1  General Surgery      "

## 2020-06-30 NOTE — BRIEF OP NOTE
[facility], [place]    Brief Operative Note    Pre-operative diagnosis: Cholangitis [K83.09]  Post-operative diagnosis Sphincterotomy bleeding    Procedure: Procedure(s):  CHOLECYSTECTOMY, LAPAROSCOPIC  endoscopic retrograde cholangiopancreatography (ERCP) with biliary stent removal, stone removal, pancreatic stent placement, cauterization and clipping of bleeding vessel.  Surgeon: Surgeon(s) and Role:     * Renzo Carter MD - Primary     * Nik Youngblood MD - Assisting     * Fabi Thomas DO - Resident - Assisting     * Maverick Chaudhry MD - Fellow - Assisting     * Ford Ferrari MD  Anesthesia: Combined General with Block   Estimated blood loss: None  Drains: None  Specimens:   ID Type Source Tests Collected by Time Destination   A : Gallbladder Organ Gallbladder and Contents SURGICAL PATHOLOGY EXAM Renzo Carter MD 6/30/2020 10:44 AM        Complications: None.  Implants:   Implant Name Type Inv. Item Serial No.  Lot No. LRB No. Used Action   STENT FERRARI PANCREA FLEX [id number] W/O FLANGE SGL PIGTAIL Stent STENT FERRARI PANCREA FLEX [id number] W/O FLANGE SGL PIGTAIL  Robinson B19- N/A 1 Explanted   STENT FERRARI PANCREA FLEX [id number] W/O FLANGE SGL PIGTAIL Stent STENT FERRARI PANCREA FLEX [id number] W/O FLANGE SGL PIGTAIL  [employer]inson K57- N/A 1 Implanted     Findings:     - Large clot was seen at the major papilla related to previous sphincterotomy.  - Previous plastic stent had migrated inward up the duct and was removed with a balloon and snare.  - Choledocholithiasis was found.  Complete removal was accomplished by balloon extraction.   - The clot was removed with a snare and a visible vessel was seen.  - A prophylactic 4Fr by 9cm Ferrari pancreatic stent was placed in the ventral duct.  - The vessel was treated with epinephrine through a 5-4-3 catheter, mild coagulation, and a hemostatic  clip.        Recommendations:  - Observe patient in PACU.   - Confirm spontaneous stent passage by performing a KUB x-ray in 6 days.   - Use a proton pump inhibitor PO BID for 2 weeks.         Maverick Chaudhry MD  Interventional Endoscopy Fellow

## 2020-06-30 NOTE — OR NURSING
Scheduled procedure not anticipated to last longer than four hours.  No urethral catheter placed.  A straight catheterization was performed in the OR prior to patient's emergence from anesthesia.  750 mL urine output was recorded by anesthesia.

## 2020-06-30 NOTE — PLAN OF CARE
"AVSS. No complaints of pain or nausea overnight. Pt is NPO for lap cholecystectomy today at 1500, needs one scrub before procedure, had shower last night. Voiding but not saving. Reported \"a few\" small, dark colored BMs last night. PIV infusing with LR @ 125. BG checks WNL. UAL in room. Continue POC.  "

## 2020-06-30 NOTE — ANESTHESIA PREPROCEDURE EVALUATION
"Anesthesia Pre-Procedure Evaluation    Patient: Mio Benoit   MRN:     [id number] Gender:   male   Age:    55 year old :      1965        Preoperative Diagnosis: Cholangitis [K83.09]   Procedure(s):  CHOLECYSTECTOMY, LAPAROSCOPIC  CHOLECYSTECTOMY, OPEN     LABS:  CBC:   Lab Results   Component Value Date    WBC 9.9 2020    WBC 16.1 (H) 2020    HGB 11.8 (L) 2020    HGB 14.6 2020    HCT 37.1 (L) 2020    HCT 44.9 2020     2020     2020     BMP:   Lab Results   Component Value Date     2020     2020    POTASSIUM 3.4 2020    POTASSIUM 3.8 2020    CHLORIDE 109 2020    CHLORIDE 104 2020    CO2 26 2020    CO2 27 2020    BUN 11 2020    BUN 14 2020    CR 1.13 2020    CR 1.08 2020     (H) 2020     (H) 2020     COAGS:   Lab Results   Component Value Date    INR 0.98 2020     POC:   Lab Results   Component Value Date     (H) 2020     OTHER:   Lab Results   Component Value Date    LACT 1.0 2020    A1C 6.9 (H) 2020    ROMEO 8.0 (L) 2020    ALBUMIN 2.8 (L) 2020    PROTTOTAL 5.8 (L) 2020     (H) 2020     (H) 2020    ALKPHOS 125 2020    BILITOTAL 5.2 (H) 2020    LIPASE 210 2020        Preop Vitals    BP Readings from Last 3 Encounters:   20 135/88    Pulse Readings from Last 3 Encounters:   20 108      Resp Readings from Last 3 Encounters:   20 16    SpO2 Readings from Last 3 Encounters:   20 93%      Temp Readings from Last 1 Encounters:   20 36.6  C (97.8  F) (Oral)    Ht Readings from Last 1 Encounters:   20 1.727 m (5' 8\")      Wt Readings from Last 1 Encounters:   20 94.6 kg (208 lb 8 oz)    Estimated body mass index is 31.7 kg/m  as calculated from the following:    Height as of this encounter: 1.727 m (5' 8\").    " Weight as of this encounter: 94.6 kg (208 lb 8 oz).     LDA:  Peripheral IV 06/28/20 Left;Posterior Lower forearm (Active)   Site Assessment WDL 06/30/20 0100   Line Status Infusing 06/30/20 0100   Phlebitis Scale 0-->no symptoms 06/30/20 0100   Infiltration Scale 0 06/30/20 0100   Infiltration Site Treatment Method  None 06/30/20 0100   Extravasation? No 06/29/20 1700   Number of days: 2        Past Medical History:   Diagnosis Date     Diabetes (H)       Past Surgical History:   Procedure Laterality Date     ENDOSCOPIC RETROGRADE CHOLANGIOPANCREATOGRAM N/A 6/28/2020    Procedure: ENDOSCOPIC RETROGRADE CHOLANGIOPANCREATOGRAPHY, pancreatic duct stenting, biliary duct sphincterotomy with stone removal and stent placement;  Surgeon: Ford Ferrari MD;  Location: [facility] OR      Allergies   Allergen Reactions     Amoxicillin              JZG FV AN PHYSICAL EXAM    Assessment:   ASA SCORE: 3    H&P: History and physical reviewed and following examination; no interval change.    NPO Status: NPO Appropriate     Plan:   Anes. Type:  General   Pre-Medication: None   Induction:  IV (Standard)   Airway: ETT; Oral   Access/Monitoring: PIV   Maintenance: Balanced     Postop Plan:   Postop Pain: Opioids  Postop Sedation/Airway: Not planned     PONV Management:   Adult Risk Factors:, Postop Opioids     CONSENT: Direct conversation   Plan and risks discussed with: Patient   Blood Products: Consented (ALL Blood Products)           Anesthesia Attending    HPI: Mio Benoit is a 55 year old male who  has a past medical history of Diabetes (H).  has a past surgical history that includes Endoscopic retrograde cholangiopancreatogram (N/A, 6/28/2020). with a Cholangitis [K83.09];Hepatobiliary tract disease [K83.9];Acute pancreatitis, unspecified complication status, unspecified pancreatitis type [K85.90] scheduled for Procedure(s):  CHOLECYSTECTOMY, LAPAROSCOPIC  CHOLECYSTECTOMY, OPEN.      PMHx/PSHx/ROS:  Past Medical History:    Diagnosis Date     Diabetes (H)        Past Surgical History:   Procedure Laterality Date     ENDOSCOPIC RETROGRADE CHOLANGIOPANCREATOGRAM N/A 6/28/2020    Procedure: ENDOSCOPIC RETROGRADE CHOLANGIOPANCREATOGRAPHY, pancreatic duct stenting, biliary duct sphincterotomy with stone removal and stent placement;  Surgeon: Ford Ferrari MD;  [place]       [facility] [place]:   Social History     Tobacco Use     Smoking status: Never Smoker     Smokeless tobacco: Never Used   Substance Use Topics     Alcohol use: Yes     Comment: 4 drinks/week         Allergies:   Allergies   Allergen Reactions     Amoxicillin        Meds:   Medications Prior to Admission   Medication Sig Dispense Refill Last Dose     glimepiride (AMARYL) 2 MG tablet Take 2 mg by mouth every morning (before breakfast)        metFORMIN (GLUCOPHAGE-XR) 500 MG 24 hr tablet Take 2,000 mg by mouth every morning (before breakfast)        simvastatin (ZOCOR) 20 MG tablet Take 20 mg by mouth At Bedtime          No current outpatient medications on file.         Labs:  BMP:  Recent Labs   Lab Test 06/29/20  0724      POTASSIUM 3.4   CHLORIDE 109   CO2 26   BUN 11   CR 1.13   *   ROMEO 8.0*     CBC:   Recent Labs   Lab Test 06/29/20  0724   WBC 9.9   RBC 4.18*   HGB 11.8*   HCT 37.1*   MCV 89   MCH 28.2   MCHC 31.8   RDW 13.2        Coags:  Recent Labs   Lab Test 06/28/20  0432   INR 0.98       Vital Signs:  T 97.8  P 108  /88  R 16  SpO2 93%    NPO status adequate.      55 year old male who  has a past medical history of Diabetes (H). to OR for Procedure(s):  CHOLECYSTECTOMY, LAPAROSCOPIC  CHOLECYSTECTOMY, OPEN    Plan for GA, standard monitors, large bore IVs, PONV prophylaxis.  Antibiotics per primary service.    Plan discussed with the anesthesia and surgery teams.  Anesthetic risks discussed with the patient, all questions answered.  Consent in chart.          India Lugo MD

## 2020-06-30 NOTE — PROGRESS NOTES
[facility], [place]    Progress Note - Leila 1 Service        Date of Admission:  6/28/2020    Assessment & Plan   Mio Benoit is a 55 year old male with a history of type 2 diabetes mellitus and hyperlipidemia admitted on 6/28/2020 with epigastric abdominal pain and rigors and was found to have cholangitis and gram negative destiny bacteremia. Patient is s/p ERCP on 6/28 and s/p cholecystectomy on 6/30.     Changes today:  - Cholecystectomy this morning  - Pancreatic stent placement today, will need KUB x-ray in 6 days to confirm spontaneous stent passage  - Antibiotics: Ceftriaxone and metronidazole  - omeprazole po BID for 2 weeks     Sepsis  Cholangitis  Klebsiella bacteremia  Leukocytosis normalized. SIRS criteria now improved. Abdominal ultrasound with enlarged common bile duct. S/p ERCP on 6/28. 1/2 blood cultures from 6/28 positive for klebsiella. Initially treated with ceftriaxone/metronidazole, but broadened on 6/28 for anti-pseudomonal coverage in the setting of sepsis and bacteremia (avoiding zosyn given amoxicillin allergy). Given isolation of Klebsiella, patient was narrowed from cefepime to ceftriaxone.   - Ceftriaxone and metronidazole, after source control and when susceptibilities available, will switch to oral antibiotics   - Surgery consult: cholecystectomy on 6/30 with ERCP for biliary stent removal and pancreatic stent placement  - Post operative pain control: tylenol PRN, oxycodone 5 mg Q4H PRN - may add additional pain control medications if needed  - omeprazole po BID for 2 weeks  - confirm spontaneous stent passage with KUB x-ray 6 days from procedure (7/6)     Epigastric pain - resolved  Initially described as chest pain, EKG nonischemic and troponin negative. Lipase was elevated to 998 (6/28, not quite 3x upper limit of normal), but resolved on 6/29. Ultrasound without evidence of pancreatitis. No CT to confirm. Will continue to monitor.   - Tylenol  PRN     Type 2 diabetes mellitus  - Hold home glimepiride and metformin  - Low dose sliding scale insulin  - Hypoglycemia protocol     Hyperlipidemia  - Continue home simvastatin     Diet: NPO  Fluids: LR at 75 mL/hr  DVT Prophylaxis: Pneumatic Compression Devices  Guajardo Catheter: not present  Code Status: Full Code           Disposition Plan   Expected discharge: 2 - 3 days, recommended to prior living arrangement once SIRS/Sepsis treated.  Entered: Susan Porter 06/30/2020, 8:28 AM       The patient's care was discussed with the Attending Physician, Dr. York.    Susan Dee 1 Service  [facility]  Pager: [phone]  Please see sticky note for cross cover information    Resident/Fellow Attestation   I, Rebecca Swenson, was present with the medical student who participated in the service and in the documentation of the note.  I have verified the history and personally performed the physical exam and medical decision making.  I agree with the assessment and plan of care as documented in the note.      Rebecca Swenson MD  PGY2  Date of Service (when I saw the patient): 06/30/20  ______________________________________________________________________    Interval History   No acute events overnight. No abdominal pain, nausea, or vomiting per nursing notes.  Patient underwent cholecystectomy with ERCP with biliary stent removal and pancreatic stent placement. Choledocholithiasis was found and stone was removed. Patient is recovering well.     Data reviewed today: I reviewed all medications, new labs and imaging results over the last 24 hours.     Physical Exam   Vital Signs: Temp: 98.4  F (36.9  C) Temp src: Oral BP: (!) 154/96 Pulse: 104 Heart Rate: 104 Resp: 18 SpO2: 98 % O2 Device: Nasal cannula Oxygen Delivery: 2 LPM(block start)  Weight: 208 lbs 8 oz  Constitutional: Sleeping in bed, appears comfortable  Respiratory: Non-labored breathing. Clear to  auscultation bilaterally.   Cardiovascular: RRR. No murmurs appreciated.   Abdomen: Soft and nondistended. Nontender to palpation.   Skin: No appreciable jaundice. No rashes or lesions. Small incisions on abdomen c/d/i.   Musculoskeletal: No significant lower extremity edema.       Data   Recent Labs   Lab 06/30/20  0654 06/29/20  0724 06/28/20  0442 06/28/20  0432   WBC 6.5 9.9  --  16.1*   HGB 11.7* 11.8*  --  14.6   MCV 88 89  --  87    278  --  329   INR 0.98  --   --  0.98    141  --  138   POTASSIUM 3.4 3.4  --  3.8   CHLORIDE 111* 109  --  104   CO2 22 26  --  27   BUN 31* 11  --  14   CR 1.03 1.13  --  1.08   ANIONGAP 7 5  --  7   ROMEO 8.3* 8.0*  --  9.1   * 165*  --  259*   ALBUMIN 2.7* 2.8*  --  3.8   PROTTOTAL 5.8* 5.8*  --  7.6   BILITOTAL 1.7* 5.2*  --  3.2*   ALKPHOS 122 125  --  155*   * 399*  --  343*   AST 81* 222*  --  494*   LIPASE  --  210  --  998*   TROPI  --   --   --  <0.015   TROPONIN  --   --  0.00  --

## 2020-06-30 NOTE — PLAN OF CARE
Returned to 7C from PACU about 1430. Pt drowsy but oriented. VSS on room air and capno. Denies nausea or pain. Lap sites CDI with dermabond. LR infusing at 75ccs/hr. Continue with POC.

## 2020-06-30 NOTE — PROGRESS NOTES
Admitted/transferred from: PACU  2 RN full   skin assessment completed by Natalie Loyola RN and Loan Lopez RN.  Skin assessment finding: skin intact, no problems   Interventions/actions: skin interventions None at this time     Will continue to monitor.

## 2020-06-30 NOTE — ANESTHESIA POSTPROCEDURE EVALUATION
Anesthesia POST Procedure Evaluation    Patient: Mio Benoit   MRN:     [id number] Gender:   male   Age:    55 year old :      1965        Preoperative Diagnosis: Cholangitis [K83.09]   Procedure(s):  CHOLECYSTECTOMY, LAPAROSCOPIC  endoscopic retrograde cholangiopancreatography (ERCP) with biliary stent removal, stone removal, pancreatic stent placement, cauterization and clipping of bleeding vessel.   Postop Comments: No value filed.     Anesthesia Type: General       Disposition: Admission   Postop Pain Control: Uneventful            Sign Out: Well controlled pain   PONV: No   Neuro/Psych: Uneventful            Sign Out: Acceptable/Baseline neuro status   Airway/Respiratory: Uneventful            Sign Out: Acceptable/Baseline resp. status   CV/Hemodynamics: Uneventful            Sign Out: Acceptable CV status   Other NRE: NONE   DID A NON-ROUTINE EVENT OCCUR? No         Last Anesthesia Record Vitals:  CRNA VITALS  2020 1227 - 2020 1327      2020             NIBP:  135/87    NIBP Mean:  104    Ht Rate:  104    SpO2:  100 %    EKG:  Sinus rhythm          Last PACU Vitals:  Vitals Value Taken Time   /89 2020  2:00 PM   Temp 37.4  C (99.3  F) 2020  1:15 PM   Pulse 93 2020  2:00 PM   Resp 19 2020  1:30 PM   SpO2 97 % 2020  2:01 PM   Temp src     NIBP 135/87 2020  1:00 PM   Pulse     SpO2 100 % 2020  1:00 PM   Resp     Temp     Ht Rate 104 2020  1:00 PM   Temp 2     Vitals shown include unvalidated device data.      Electronically Signed By: India Lugo MD, 2020, 2:02 PM

## 2020-07-01 VITALS
WEIGHT: 208.5 LBS | DIASTOLIC BLOOD PRESSURE: 77 MMHG | OXYGEN SATURATION: 98 % | RESPIRATION RATE: 16 BRPM | HEART RATE: 98 BPM | HEIGHT: 68 IN | SYSTOLIC BLOOD PRESSURE: 134 MMHG | BODY MASS INDEX: 31.6 KG/M2 | TEMPERATURE: 98.3 F

## 2020-07-01 LAB
ALBUMIN SERPL-MCNC: 2.5 G/DL (ref 3.4–5)
ALP SERPL-CCNC: 108 U/L (ref 40–150)
ALT SERPL W P-5'-P-CCNC: 178 U/L (ref 0–70)
ANION GAP SERPL CALCULATED.3IONS-SCNC: 4 MMOL/L (ref 3–14)
AST SERPL W P-5'-P-CCNC: 56 U/L (ref 0–45)
BASOPHILS # BLD AUTO: 0 10E9/L (ref 0–0.2)
BASOPHILS NFR BLD AUTO: 0.5 %
BILIRUB SERPL-MCNC: 1 MG/DL (ref 0.2–1.3)
BUN SERPL-MCNC: 24 MG/DL (ref 7–30)
CALCIUM SERPL-MCNC: 7.8 MG/DL (ref 8.5–10.1)
CHLORIDE SERPL-SCNC: 111 MMOL/L (ref 94–109)
CO2 SERPL-SCNC: 24 MMOL/L (ref 20–32)
CREAT SERPL-MCNC: 1.08 MG/DL (ref 0.66–1.25)
DIFFERENTIAL METHOD BLD: ABNORMAL
EOSINOPHIL # BLD AUTO: 0.1 10E9/L (ref 0–0.7)
EOSINOPHIL NFR BLD AUTO: 1.3 %
ERYTHROCYTE [DISTWIDTH] IN BLOOD BY AUTOMATED COUNT: 13.3 % (ref 10–15)
GFR SERPL CREATININE-BSD FRML MDRD: 77 ML/MIN/{1.73_M2}
GLUCOSE BLDC GLUCOMTR-MCNC: 142 MG/DL (ref 70–99)
GLUCOSE SERPL-MCNC: 146 MG/DL (ref 70–99)
HCT VFR BLD AUTO: 29.3 % (ref 40–53)
HGB BLD-MCNC: 9.3 G/DL (ref 13.3–17.7)
HGB BLD-MCNC: 9.4 G/DL (ref 13.3–17.7)
IMM GRANULOCYTES # BLD: 0.1 10E9/L (ref 0–0.4)
IMM GRANULOCYTES NFR BLD: 0.9 %
LYMPHOCYTES # BLD AUTO: 0.5 10E9/L (ref 0.8–5.3)
LYMPHOCYTES NFR BLD AUTO: 5.4 %
MCH RBC QN AUTO: 27.8 PG (ref 26.5–33)
MCHC RBC AUTO-ENTMCNC: 31.7 G/DL (ref 31.5–36.5)
MCV RBC AUTO: 88 FL (ref 78–100)
MONOCYTES # BLD AUTO: 0.7 10E9/L (ref 0–1.3)
MONOCYTES NFR BLD AUTO: 8 %
NEUTROPHILS # BLD AUTO: 7.4 10E9/L (ref 1.6–8.3)
NEUTROPHILS NFR BLD AUTO: 83.9 %
NRBC # BLD AUTO: 0 10*3/UL
NRBC BLD AUTO-RTO: 0 /100
PLATELET # BLD AUTO: 252 10E9/L (ref 150–450)
POTASSIUM SERPL-SCNC: 3.2 MMOL/L (ref 3.4–5.3)
PROT SERPL-MCNC: 5.1 G/DL (ref 6.8–8.8)
RBC # BLD AUTO: 3.35 10E12/L (ref 4.4–5.9)
SODIUM SERPL-SCNC: 139 MMOL/L (ref 133–144)
WBC # BLD AUTO: 8.8 10E9/L (ref 4–11)

## 2020-07-01 PROCEDURE — 36415 COLL VENOUS BLD VENIPUNCTURE: CPT | Performed by: STUDENT IN AN ORGANIZED HEALTH CARE EDUCATION/TRAINING PROGRAM

## 2020-07-01 PROCEDURE — 25000132 ZZH RX MED GY IP 250 OP 250 PS 637: Performed by: STUDENT IN AN ORGANIZED HEALTH CARE EDUCATION/TRAINING PROGRAM

## 2020-07-01 PROCEDURE — 99239 HOSP IP/OBS DSCHRG MGMT >30: CPT | Mod: GC | Performed by: PEDIATRICS

## 2020-07-01 PROCEDURE — 80053 COMPREHEN METABOLIC PANEL: CPT | Performed by: STUDENT IN AN ORGANIZED HEALTH CARE EDUCATION/TRAINING PROGRAM

## 2020-07-01 PROCEDURE — 25000128 H RX IP 250 OP 636: Performed by: STUDENT IN AN ORGANIZED HEALTH CARE EDUCATION/TRAINING PROGRAM

## 2020-07-01 PROCEDURE — 00000146 ZZHCL STATISTIC GLUCOSE BY METER IP

## 2020-07-01 PROCEDURE — 85025 COMPLETE CBC W/AUTO DIFF WBC: CPT | Performed by: STUDENT IN AN ORGANIZED HEALTH CARE EDUCATION/TRAINING PROGRAM

## 2020-07-01 PROCEDURE — 85018 HEMOGLOBIN: CPT | Performed by: STUDENT IN AN ORGANIZED HEALTH CARE EDUCATION/TRAINING PROGRAM

## 2020-07-01 PROCEDURE — 25000132 ZZH RX MED GY IP 250 OP 250 PS 637: Performed by: INTERNAL MEDICINE

## 2020-07-01 RX ORDER — METRONIDAZOLE 500 MG/1
500 TABLET ORAL 3 TIMES DAILY
Qty: 33 TABLET | Refills: 0 | Status: SHIPPED | OUTPATIENT
Start: 2020-07-01 | End: 2020-07-01

## 2020-07-01 RX ORDER — OXYCODONE HYDROCHLORIDE 5 MG/1
5 TABLET ORAL EVERY 6 HOURS PRN
Qty: 10 TABLET | Refills: 0 | Status: SHIPPED | OUTPATIENT
Start: 2020-07-01 | End: 2020-07-02

## 2020-07-01 RX ORDER — METRONIDAZOLE 500 MG/1
500 TABLET ORAL 3 TIMES DAILY
Qty: 33 TABLET | Refills: 0 | Status: SHIPPED | OUTPATIENT
Start: 2020-07-01

## 2020-07-01 RX ORDER — OXYCODONE HYDROCHLORIDE 5 MG/1
5 TABLET ORAL EVERY 6 HOURS PRN
Qty: 10 TABLET | Refills: 0 | Status: SHIPPED | OUTPATIENT
Start: 2020-07-01 | End: 2020-07-01

## 2020-07-01 RX ORDER — METRONIDAZOLE 500 MG/1
500 TABLET ORAL 3 TIMES DAILY
Status: DISCONTINUED | OUTPATIENT
Start: 2020-07-01 | End: 2020-07-01 | Stop reason: HOSPADM

## 2020-07-01 RX ORDER — CIPROFLOXACIN 500 MG/1
500 TABLET, FILM COATED ORAL EVERY 12 HOURS
Qty: 22 TABLET | Refills: 0 | Status: SHIPPED | OUTPATIENT
Start: 2020-07-01 | End: 2020-07-01

## 2020-07-01 RX ORDER — SIMVASTATIN 20 MG
20 TABLET ORAL AT BEDTIME
Qty: 30 TABLET | Refills: 0 | Status: SHIPPED | OUTPATIENT
Start: 2020-07-01 | End: 2020-07-01

## 2020-07-01 RX ORDER — CIPROFLOXACIN 500 MG/1
500 TABLET, FILM COATED ORAL EVERY 12 HOURS SCHEDULED
Status: DISCONTINUED | OUTPATIENT
Start: 2020-07-01 | End: 2020-07-01 | Stop reason: HOSPADM

## 2020-07-01 RX ORDER — CIPROFLOXACIN 500 MG/1
500 TABLET, FILM COATED ORAL EVERY 12 HOURS
Qty: 22 TABLET | Refills: 0 | Status: SHIPPED | OUTPATIENT
Start: 2020-07-01

## 2020-07-01 RX ORDER — SIMVASTATIN 20 MG
20 TABLET ORAL AT BEDTIME
Qty: 30 TABLET | Refills: 0 | Status: SHIPPED | OUTPATIENT
Start: 2020-07-01

## 2020-07-01 RX ADMIN — METRONIDAZOLE 500 MG: 500 INJECTION, SOLUTION INTRAVENOUS at 02:20

## 2020-07-01 RX ADMIN — OMEPRAZOLE 20 MG: 20 CAPSULE, DELAYED RELEASE ORAL at 08:43

## 2020-07-01 RX ADMIN — METRONIDAZOLE 500 MG: 500 INJECTION, SOLUTION INTRAVENOUS at 10:30

## 2020-07-01 RX ADMIN — OMEPRAZOLE 20 MG: 20 CAPSULE, DELAYED RELEASE ORAL at 16:42

## 2020-07-01 RX ADMIN — ACETAMINOPHEN 650 MG: 325 TABLET, FILM COATED ORAL at 02:22

## 2020-07-01 RX ADMIN — METRONIDAZOLE 500 MG: 500 TABLET ORAL at 16:42

## 2020-07-01 ASSESSMENT — ACTIVITIES OF DAILY LIVING (ADL)
ADLS_ACUITY_SCORE: 12
ADLS_ACUITY_SCORE: 12
ADLS_ACUITY_SCORE: 10
ADLS_ACUITY_SCORE: 10
ADLS_ACUITY_SCORE: 12
ADLS_ACUITY_SCORE: 10

## 2020-07-01 NOTE — PROGRESS NOTES
[facility], [place]    Progress Note - Leila 1 Service        Date of Admission:  6/28/2020    Assessment & Plan   Mio Benoit is a 55 year old male with a history of type 2 diabetes mellitus and hyperlipidemia admitted on 6/28/2020 with epigastric abdominal pain and rigors and was found to have cholangitis and gram negative destiny bacteremia. Patient is s/p ERCP on 6/28 and s/p cholecystectomy with ERCP and pancreatic stent placement on 6/30.     Changes today:  - Advanced to regular diet today; patient is tolerating po diet well. Pain is well controlled.   - Antibiotics: switched to po ciprofloxacin and po metronidazole  - omeprazole po BID for 2 weeks     Sepsis  Cholangitis  Klebsiella bacteremia  Leukocytosis normalized. SIRS criteria now improved. Abdominal ultrasound with enlarged common bile duct. S/p ERCP on 6/28. 1/2 blood cultures from 6/28 positive for klebsiella. Initially treated with ceftriaxone/metronidazole, but broadened on 6/28 for anti-pseudomonal coverage in the setting of sepsis and bacteremia (avoiding zosyn given amoxicillin allergy). Given isolation of Klebsiella, patient was narrowed from cefepime to ceftriaxone. S/p cholecystectomy with ERCP for biliary stent removal and pancreatic stent placement on 6/30.   - ciprofloxacin po 500 mg q12h and metronidazole po 500 mg q8h for 14 day course (end date: 7/12)  - Post-operative pain control: tylenol PRN, oxycodone 5 mg Q4H PRN - may add additional pain control medications if needed  - omeprazole po BID for 2 weeks  - surgery will call for follow-up in 1 week  - GI will arrange follow-up x-ray to confirm passage of pancreatic duct stent     Epigastric pain - resolved  Initially described as chest pain, EKG nonischemic and troponin negative. Lipase was elevated to 998 (6/28, not quite 3x upper limit of normal), but resolved on 6/29. Ultrasound without evidence of pancreatitis. No CT to confirm. Will continue to  monitor.   - Tylenol PRN     Type 2 diabetes mellitus  - Hold home glimepiride and metformin  - Low dose sliding scale insulin  - Hypoglycemia protocol     Hyperlipidemia  - Continue home simvastatin      Diet: Regular  Fluids: None  DVT Prophylaxis: Pneumatic Compression Devices  Guajardo Catheter: not present  Code Status: Full Code           Disposition Plan   Expected discharge: today or tomorrow, recommended to prior living arrangement once SIRS/Sepsis treated and adequate pain control and tolerating diet.  Entered: Susan Porter 07/01/2020, 7:57 AM       The patient's care was discussed with the Attending Physician, Dr. York.    Susan Dee 1 Service  [facility]  Pager: [phone]  Please see sticky note for cross cover information    Resident/Fellow Attestation   I, Maya Brown, was present with the medical student who participated in the service and in the documentation of the note.  I have verified the history and personally performed the physical exam and medical decision making.  I agree with the assessment and plan of care as documented in the note.      Maya Brown MD  PGY3  Date of Service (when I saw the patient): 07/01/20  _______________________________________________    Interval History   No acute events overnight. Patient is passing gas, having bowel movements, and voiding. He reports some abdominal discomfort around his incisions and has a headache but says his pain is well controlled. Patient reports that he continues to have dark stools, but that they have become less dark and more green in color. He notes some swelling in his hands.    Data reviewed today: I reviewed all medications, new labs and imaging results over the last 24 hours.     Physical Exam   Vital Signs: Temp: 97.2  F (36.2  C) Temp src: Oral BP: 136/74 Pulse: 85 Heart Rate: 92 Resp: 18 SpO2: 98 % O2 Device: None (Room air) Oxygen Delivery: 2  LPM  Weight: 208 lbs 8 oz  Constitutional: Sitting in bed, appears comfortable  HEENT: no scleral icterus.   Respiratory: Non-labored breathing. Clear to auscultation bilaterally.   Cardiovascular: RRR. No murmurs appreciated.   Abdomen: Soft and nondistended. Mild tenderness to palpation.   Skin: No appreciable jaundice. No rashes or lesions. Small incisions on abdomen c/d/i.   Musculoskeletal: No significant lower extremity edema, mild edema in bilateral hands       Data   Recent Labs   Lab 07/01/20  0653 06/30/20  1825 06/30/20  1337 06/30/20  0654 06/29/20  0724 06/28/20  0442 06/28/20  0432   WBC 8.8  --  12.1* 6.5 9.9  --  16.1*   HGB 9.3* 10.2* 11.4* 11.7* 11.8*  --  14.6   MCV 88  --  89 88 89  --  87     --  287 271 278  --  329   INR  --   --   --  0.98  --   --  0.98     --   --  140 141  --  138   POTASSIUM 3.2*  --   --  3.4 3.4  --  3.8   CHLORIDE 111*  --   --  111* 109  --  104   CO2 24  --   --  22 26  --  27   BUN 24  --   --  31* 11  --  14   CR 1.08  --   --  1.03 1.13  --  1.08   ANIONGAP 4  --   --  7 5  --  7   ROMEO 7.8*  --   --  8.3* 8.0*  --  9.1   *  --   --  172* 165*  --  259*   ALBUMIN 2.5*  --   --  2.7* 2.8*  --  3.8   PROTTOTAL 5.1*  --   --  5.8* 5.8*  --  7.6   BILITOTAL 1.0  --   --  1.7* 5.2*  --  3.2*   ALKPHOS 108  --   --  122 125  --  155*   *  --   --  269* 399*  --  343*   AST 56*  --   --  81* 222*  --  494*   LIPASE  --   --   --   --  210  --  998*   TROPI  --   --   --   --   --   --  <0.015   TROPONIN  --   --   --   --   --  0.00  --

## 2020-07-01 NOTE — PROVIDER NOTIFICATION
Paged [phone]:   pt had broth and tea for lunch, tolerated well. ate about 50% of that, no pain or nausea. would like to try toast. can you change his diet order?

## 2020-07-01 NOTE — PLAN OF CARE
POD#0 Lap balbir, stent removal and placement.    Alert and oriented. Pain managed with prn oxycodone 5 mg with relief. MIVF at 75 ml/hr via PIV. Having loose/mucoid black/green stool. Per provider, is normal for the next few days. + gas and voiding but not saving. Per pt he is voiding in good urine volume. Lap site x 3 upper abdomen and umbilical area dermabonded-CDI/KENY. Using IS. OVSS, afebrile. NPO for now per provider. Still on IV Abx. Ambulating with SBA of 1. PLAN: Continue with the post op cares. Pain management

## 2020-07-01 NOTE — PLAN OF CARE
AVSS on room air. Pt denies pain or nausea. Tried clear liquids and tolerated well. Plan to try toast next and if it goes well then likely will discharge home this afternoon. Independent in room and cooperative of cares.    Addendum: pt able to tolerate dry toast, plans to try a banana and diet coke now.    Problem: Adult Inpatient Plan of Care  Goal: Plan of Care Review  Outcome: Improving  Flowsheets (Taken 7/1/2020 1345)  Plan of Care Reviewed With: patient  Progress: improving  Goal: Absence of Hospital-Acquired Illness or Injury  Outcome: Improving  Goal: Optimal Comfort and Wellbeing  Outcome: Improving  Goal: Readiness for Transition of Care  Outcome: Improving     Problem: Bleeding (Surgery Nonspecified)  Goal: Absence of Bleeding  Outcome: Completed     Problem: Bowel Elimination Impaired (Surgery Nonspecified)  Goal: Effective Bowel Elimination  Outcome: Improving     Problem: Pain (Surgery Nonspecified)  Goal: Acceptable Pain Control  Outcome: Improving     Problem: Postoperative Nausea and Vomiting (Surgery Nonspecified)  Goal: Nausea and Vomiting Relief  Outcome: Improving

## 2020-07-01 NOTE — PROGRESS NOTES
"    GASTROENTEROLOGY PROGRESS NOTE    Date: 07/01/2020     ASSESSMENT:  55-year-old man with history of diabetes admitted with acute cholangitis with gram-negative bacteremia, status post ERCP, subsequently underwent lap cholecystectomy yesterday, EGD was done to remove the biliary duct stent, however sphincterotomy site bleeding with visible vessel was noted which was treated.  The pancreatic duct stent was placed at that time.  This morning patient feels well.  His labs are improving and are stable.  He is tolerating diet.    RECOMMENDATIONS:  -Advance diet per surgery recommendation  -Okay to discharge from GI standpoint  -Antibiotics for bacteremia per primary team      Gastroenterology follow up recommendations: -Will need follow-up x-ray in 4 weeks to ensure passage of pancreatic duct stent; GI will arrange this     GI service will sign off at this time.  Thank you for involving us in this patient's care. Please do not hesitate to contact the GI service with any questions or concerns.      Pt care plan discussed with Dr. Burgos, GI staff physician.    Sergio BILL MD  Gastroenterology Fellow  Division of Gastroenterology, Hepatology and Nutrition  [facility]    _______________________________________________________________    24 Hour Events:  Had lap balbir yesterday  Also EGD to remove common bile duct stone, however sphincterotomy site bleeding was noted, visible vessel was treated with epinephrine and bipolar coagulation with 1 hemostatic clip  Pancreatic duct stent was placed    Subjective:  Patient feels well this morning  Denies any nausea or vomiting  Is tolerating liquid diet  Feels some discomfort in the right upper quadrant, but feels overall improved since he came to the hospital  Having loose bowel movements, no melena    Objective:  Blood pressure 130/78, pulse 87, temperature 98.2  F (36.8  C), temperature source Oral, resp. rate 16, height 1.727 m (5' 8\"), weight 94.6 kg (208 lb 8 oz), " SpO2 97 %.    Gen: A&Ox3, NAD  HEENT: ncat, perrl, eomi, sclera anicteric  CV: RRR  Lungs: CTA b/l  Abd:  +bs, soft, nondistended, mild right upper quadrant tenderness  Skin: no jaundice, no stigmata of chronic liver disease  MS: appropriate muscle mass for age  Neuro: non focal       LABS:  BMP  Recent Labs   Lab 07/01/20  0653 06/30/20  0654 06/29/20  0724 06/28/20  0432    140 141 138   POTASSIUM 3.2* 3.4 3.4 3.8   CHLORIDE 111* 111* 109 104   ROMEO 7.8* 8.3* 8.0* 9.1   CO2 24 22 26 27   BUN 24 31* 11 14   CR 1.08 1.03 1.13 1.08   * 172* 165* 259*     CBC  Recent Labs   Lab 07/01/20  0653  06/30/20  1337 06/30/20  0654 06/29/20  0724   WBC 8.8  --  12.1* 6.5 9.9   RBC 3.35*  --  4.02* 4.17* 4.18*   HGB 9.3*   < > 11.4* 11.7* 11.8*   HCT 29.3*  --  35.8* 36.8* 37.1*   MCV 88  --  89 88 89   MCH 27.8  --  28.4 28.1 28.2   MCHC 31.7  --  31.8 31.8 31.8   RDW 13.3  --  13.2 13.0 13.2     --  287 271 278    < > = values in this interval not displayed.     INR  Recent Labs   Lab 06/30/20  0654 06/28/20  0432   INR 0.98 0.98     LFTs  Recent Labs   Lab 07/01/20  0653 06/30/20  0654 06/29/20  0724 06/28/20  0432   ALKPHOS 108 122 125 155*   AST 56* 81* 222* 494*   * 269* 399* 343*   BILITOTAL 1.0 1.7* 5.2* 3.2*   PROTTOTAL 5.1* 5.8* 5.8* 7.6   ALBUMIN 2.5* 2.7* 2.8* 3.8      PANC  Recent Labs   Lab 06/29/20  0724 06/28/20  0432   LIPASE 210 998*       IMAGING:   Reviewed

## 2020-07-01 NOTE — PROGRESS NOTES
Surgery Progress Note  07/01/2020       Subjective:  - MIKAL overnight.  - Having Loose bowel movements and voiding without issue  - Pain well controlled  - Ambulating independently     Objective:  Temp:  [97.2  F (36.2  C)-99.3  F (37.4  C)] 97.2  F (36.2  C)  Pulse:  [] 85  Heart Rate:  [] 92  Resp:  [16-21] 18  BP: (133-166)/() 136/74  SpO2:  [94 %-100 %] 98 %    I/O last 3 completed shifts:  In: 3038.75 [I.V.:3038.75]  Out: 950 [Urine:750; Blood:200]      Gen: Awake, alert, NAD  Resp: NLB on RA  Abd: soft, nondistended, appropriately tender in RUQ  Incision: c/d/I and covered with exophin glue  Ext: WWP, LE edema and hand edema noted     Labs:  Recent Labs   Lab 07/01/20  0653 06/30/20  1825 06/30/20  1337 06/30/20  0654   WBC 8.8  --  12.1* 6.5   HGB 9.3* 10.2* 11.4* 11.7*     --  287 271       Recent Labs   Lab 07/01/20  0653 06/30/20  0654 06/29/20  0724    140 141   POTASSIUM 3.2* 3.4 3.4   CHLORIDE 111* 111* 109   CO2 24 22 26   BUN 24 31* 11   CR 1.08 1.03 1.13   * 172* 165*   ROMEO 7.8* 8.3* 8.0*       Imaging:  No new imaging 7/1/2020     Assessment/Plan:   55 year old male with PMH of T2DM and HLD admitted 6/28/2020 for cholangitis and gram negative destiny bacteremia.  ERCP performed 6/28/2020, laparoscopic cholecystectomy performed 6/30/2020, repeat ERCP performed 6/30/2020.  POD#1 s/p laparoscopic cholecystectomy patient reports feeling good, mild RUQ tenderness, no N/V, having BMs, voiding freely, ambulating independently, remains hemodynamically stable.    Surgery recommendations:   - OK for regular diet   - OK for activity as tolerated, NO lifting more than 10 pounds for 2 weeks  - OK to shower, no submerging in water  - Glue will fall off by itself, NO scrubbing of incisions, OK for soap and water to gentle rinse over incisions  - OK for discharge from surgery perspective  - We will call patient within 1 week for follow-up  - Surgery medications in navigator tab  -  ABx per medicine primary team recs    Surgery will be signing off, please call for any questions or concerns.      Seen, examined, and discussed with chief resident, who will discuss with staff.    Ankit Dias MD  General Surgery PGY-1      - - - - - - - - - - - - - - - - - -

## 2020-07-01 NOTE — PLAN OF CARE
A x O, VSS on RA. Capno in place. Tylenol given for pain w/ relief. Denies n/v. NPO. BG overnight 142. Receiving IVABX. Voiding spontaneously. SBA. Resting between cares. Continue POC.

## 2020-07-01 NOTE — DISCHARGE SUMMARY
[facility], [place]  Discharge Summary - Medicine & Pediatrics       Date of Admission:  6/28/2020  Date of Discharge:  7/1/2020  Discharging Provider: Dr. Priyanka York  Discharge Service: Leila Villegas    Discharge Diagnoses   1. Sepsis  2. Cholangitis  3. Klebsiella bacteremia  4. Type 2 DM  5. Hyperlipidemia    Follow-ups Needed After Discharge   Follow-up Appointments     Adult [facility] Follow-up and recommended labs and tests      Follow up with primary care provider, Physician No Ref-Primary, within 7   days to evaluate after surgery and for hospital follow- up.  The following   labs/tests are recommended: CBC, BMP    Follow up with GI within 4-6 weeks with XR of the abdomen  Follow up with General Surgery within 2-4 weeks    Appointments on [place] and/or [facility] (with [facility] or [facility]   provider or service). Call [phone] if you haven't heard regarding   these appointments within 7 days of discharge.             Unresulted Labs Ordered in the Past 30 Days of this Admission     Date and Time Order Name Status Description    6/30/2020 0100 Blood culture Preliminary     6/29/2020 0001 Blood culture Preliminary     6/28/2020 0544 Blood culture Preliminary       These results will be followed up by PCP    Discharge Disposition   Discharged to home  Condition at discharge: Good      Hospital Course   Mio Benoit is a 55 year old male with a history of type 2 diabetes mellitus and hyperlipidemia admitted on 6/28/2020 with epigastric abdominal pain and rigors and was found to have cholangitis and gram negative destiny bacteremia. Patient is s/p ERCP on 6/28 and s/p cholecystectomy with ERCP and pancreatic stent placement on 6/30.     Sepsis  Cholangitis  Klebsiella bacteremia  Initially presented septic. Abdominal ultrasound with enlarged common bile duct. S/p ERCP on 6/28 which demonstrated that the major papilla was bulging, edematous, and located partially within a  diverticulum; few small floating stone/debris in the gallbladder.  A protective pancreatic stent was placed, and a full biliary sphincterotomy bile duct sweep was completed, with placement of a 10 F biliary stent.  Findings were consistent with calculus disease with obstructive cholangitis due to small bile duct stone and nondilated duct, with gallbladder stones/sludge. 1/2 blood cultures from 6/28 positive for klebsiella. Initially treated with ceftriaxone/metronidazole, but broadened on 6/28 for anti-pseudomonal coverage in the setting of sepsis and bacteremia (avoiding zosyn given amoxicillin allergy). Given isolation of Klebsiella, patient was narrowed from cefepime to ceftriaxone. S/p cholecystectomy with ERCP for biliary stent removal and pancreatic stent placement on 6/30. Diet was advanced slowly with good tolerance to a regular diet.  - ciprofloxacin po 500 mg q12h and metronidazole po 500 mg q8h for 14 day course (end date: 7/12)  - Post-operative pain control: tylenol PRN, oxycodone 5 mg Q4H PRN. Patient did not require any oxycodone on day of discharge. He required only one dose the day prior to discharge. There was confusion on where the patient wanted his medications sent, [facility], but [facility], but then back [facility]. In the switching, his oxycodone prescription was shredded in the discharge pharmacy and patient was not able to pick this medication up prior to discharging the hospital. The am of 7/2, the oxycodone prescription was sent electronically to his pharmacy.  - omeprazole po BID for 2 weeks  - surgery will call for follow-up in 1 week  - GI will arrange follow-up x-ray to confirm passage of pancreatic duct stent  - Patient may continue a regular low fat diet as able at home.     Epigastric pain - resolved  Initially described as chest pain, EKG nonischemic and troponin negative. Lipase was elevated to 998 (6/28, not quite 3x upper limit  of normal), but resolved on 6/29. Ultrasound without evidence of pancreatitis. No CT to confirm.      Type 2 diabetes mellitus  - Restart PTA  glimepiride and metformin on discharge     Hyperlipidemia  - Continue PTA simvastatin    Consultations This Hospital Stay   GI PANCREATICOBILIARY ADULT IP CONSULT  VASCULAR ACCESS CARE ADULT IP CONSULT  SURGERY GENERAL ADULT IP CONSULT    Code Status   Full Code       The patient was discussed with MD [facility] [place] [facility]  [facility] [place]  Pager: [phone]  ______________________________________________________________________    Physical Exam   Vital Signs:                    Weight: 208 lbs 8 oz  Constitutional: Sitting in bed, appears comfortable  HEENT: no scleral icterus.   Respiratory: Non-labored breathing. Clear to auscultation bilaterally.   Cardiovascular: RRR. No murmurs appreciated.   Abdomen: Soft and nondistended. Mild tenderness to palpation.   Skin: No appreciable jaundice. No rashes or lesions. Small incisions on abdomen c/d/i.   Musculoskeletal: No significant lower extremity edema, mild edema in bilateral hands       Primary Care Physician   Physician No Ref-Primary    Discharge Orders      Discharge Instructions    Discharge Instructions    A nurse from the General Surgery Clinic will contact you with 24 hours, or next business day, after your discharge from the hospital. If you have questions or to schedule a follow up appointment please call at General Surgery clinic at [phone].    Please follow up with the General Surgery Clinic at [phone].    Activity  - No lifting, pushing, pulling more than 15-20 pounds for 3-4 weeks    Incisions  - You may shower and get incisions wet starting 24 hrs after surgery  - Do not scrub incisions or submerge wounds (e.g. bath, pool, hot tub, etc.) for 2 weeks or until the glue or steri-strips have come off    Drain Care  - You do not have a drain.   Specific care/instructions:  Not Applicable    Medications  - Do not take any additional Tylenol (acetaminophen) while using a narcotic pain medication which includes acetaminophen  - Do not take more than 4,000mg of acetaminophen in any 24 hour period, as this can cause liver damage  - Take stool softeners such as Senna or Miralax while you are using narcotics, but stop if you develop diarrhea  - Wean yourself off of narcotic pain medications    Follow-Up:  - Call your primary care provider to touch base regarding your recent admission.    - Call or return sooner than your regularly scheduled visit if you develop any of the following: fever >101.5, uncontrolled pain, uncontrolled nausea or vomiting, as well as increased redness, swelling, or drainage from your wound.   -  A nurse from the General Surgery Clinic will contact you 24 hours, or next business day, after your discharge from the hospital.  If you have questions or to schedule a follow up appointment please call the General Surgery Clinic at [phone].  Call [phone] and ask to speak with the Surgery resident on call if you are having troubles in the evenings, at night, or on the weekend.    -  If you are receiving home care please inform your home care nurse of our contact number.     Reason for your hospital stay    You were admitted to the hospital with ascending cholangitis, an infection of your gall bladder that is very severe. You were also found to have bacteria in your blood. You had a stent placed in your pancreatic duct and you had your gallbladder removed.     Adult [facility] Follow-up and recommended labs and tests    Follow up with primary care provider, Physician No Ref-Primary, within 7 days to evaluate after surgery and for hospital follow- up.  The following labs/tests are recommended: CBC, BMP    Follow up with GI within 4-6 weeks with XR of the abdomen  Follow up with General Surgery within 2-4 weeks    Appointments on [place]  and/or [facility] (with [facility] or [facility] provider or service). Call [phone] if you haven't heard regarding these appointments within 7 days of discharge.     Activity    Your activity upon discharge: activity restrictions per general surgery     When to contact your care team    Call your primary doctor if you have any of the following: temperature greater than 100.4,  increased shortness of breath, increased drainage from your incision, increased swelling around your incision site or in your abdomen or increased abdominal pain.     Full Code     Diet    Follow this diet upon discharge: Orders Placed This Encounter      Regular Diet Adult       Significant Results and Procedures   Results for orders placed or performed during the hospital encounter of 06/28/20   XR Chest Port 1 View    Narrative    EXAM: CHEST SINGLE VIEW PORTABLE  LOCATION: [facility]  DATE/TIME: 06/28/2020, 5:04 AM    INDICATION: Chest pain.  COMPARISON: None.    FINDINGS: The lungs are clear. Normal size cardiac silhouette      Impression    IMPRESSION: No evidence of active cardiopulmonary disease.      Abdomen US, limited (RUQ only)    Narrative    EXAMINATION: Limited Abdominal Ultrasound, 6/28/2020 6:42 AM     COMPARISON: None    History: RUQ Pain, evaluate for cholecystitis, RUQ Pain, evaluate for  cholecystitis.     FINDINGS:   Fluid: No evidence of ascites or pleural effusions.    Liver: The liver demonstrates mild hyperechoic architecture, measuring  14.5 cm in craniocaudal dimension. There is no focal mass.     Gallbladder: There is no wall thickening, pericholecystic fluid,  positive sonographic Moore's sign or evidence for cholelithiasis.    Bile Ducts: Intrahepatic biliary system appears normal caliber.  The  common bile duct measures 9.5 mm in diameter.    Pancreas: Visualized portions of the head and body of the pancreas are  unremarkable.     Kidney: The right kidney measures 12.2 cm long. There is  no  hydronephrosis or hydroureter, no shadowing renal calculi, cystic  lesion or mass.       Impression    IMPRESSION:   1.  Hyperechoic liver parenchyma which can be seen in setting of  intrinsic intraparenchymal disease.  2.  Enlarged common bile duct without additional evidence of  cholelithiasis, choledocholithiasis or cholecystitis.     I have personally reviewed the examination and initial interpretation  and I agree with the findings.    OUMAR JACQUES MD   XR Surgery DALI Fluoro G/T 5 Min w Stills    Narrative    This exam was marked as non-reportable because it will not be read by a   radiologist or a [place] non-radiologist provider.         XR Surgery DALI L/T 5 Min Fluoro w Stills    Narrative    This exam was marked as non-reportable because it will not be read by a   radiologist or a [place] non-radiologist provider.           Surgery/Procedures:  ENDOSCOPIC RETROGRADE CHOLANGIOPANCREATOGRAPHY, pancreatic duct stenting, biliary duct sphincterotomy with stone removal and stent placement: 6/28/20          CBD stone and GG stones/sludge    CHOLECYSTECTOMY, LAPAROSCOPIC  CHOLECYSTECTOMY, OPEN  Laparoscopic transgastric endoscopic retrograde cholangiopancreatography (ERCP) 6/30/20          Findings: thick and inflamed gallbladder.          Complications:Unanticipated bleeding from cystic artery which was controlled with clips. No PRBC transfusion needed.  - Large clot was seen at the major papilla related to previous sphincterotomy.  - Previous plastic stent had migrated inward up the duct and was removed with a balloon and snare.  - Choledocholithiasis was found.  Complete removal was accomplished by balloon extraction.   - The clot was removed with a snare and a visible vessel was seen.  - A prophylactic 4Fr by 9cm Ferrari pancreatic stent was placed in the ventral duct.  - The vessel was treated with epinephrine through a 5-4-3 catheter, mild coagulation, and a hemostatic clip.    Discharge Medications    Discharge Medication List as of 7/1/2020  5:36 PM      CONTINUE these medications which have CHANGED    Details   ciprofloxacin (CIPRO) 500 MG tablet Take 1 tablet (500 mg) by mouth every 12 hours, Disp-22 tablet,R-0, E-Prescribe      metroNIDAZOLE (FLAGYL) 500 MG tablet Take 1 tablet (500 mg) by mouth 3 times daily, Disp-33 tablet,R-0, E-Prescribe      omeprazole (PRILOSEC) 20 MG DR capsule Take 1 capsule (20 mg) by mouth 2 times daily (before meals), Disp-28 capsule,R-0, E-Prescribe      simvastatin (ZOCOR) 20 MG tablet Take 1 tablet (20 mg) by mouth At Bedtime, Disp-30 tablet,R-0, E-Prescribe      oxyCODONE (ROXICODONE) 5 MG tablet Take 1 tablet (5 mg) by mouth every 6 hours as needed for pain (for post-operative pain mgmt for laparoscopic cholecystectomy), Disp-10 tablet,R-0, E-Prescribe         CONTINUE these medications which have NOT CHANGED    Details   glimepiride (AMARYL) 2 MG tablet Take 2 mg by mouth every morning (before breakfast), Historical      metFORMIN (GLUCOPHAGE-XR) 500 MG 24 hr tablet Take 2,000 mg by mouth every morning (before breakfast), Historical           Allergies   Allergies   Allergen Reactions     Amoxicillin

## 2020-07-02 ENCOUNTER — PATIENT OUTREACH (OUTPATIENT)
Dept: SURGERY | Facility: CLINIC | Age: 55
End: 2020-07-02

## 2020-07-02 LAB — COPATH REPORT: NORMAL

## 2020-07-02 RX ORDER — OXYCODONE HYDROCHLORIDE 5 MG/1
5 TABLET ORAL EVERY 6 HOURS PRN
Qty: 5 TABLET | Refills: 0 | Status: SHIPPED | OUTPATIENT
Start: 2020-07-02

## 2020-07-02 NOTE — PROGRESS NOTES
Attempted to reach patient for post discharge/procedure telephone call.  Number listed is out of service ([phone]).  Left brief message on SO VM to call office.    Patient will need PO appointment (virtual or in-person) with Dr. Youngblood after open balbir.    ADDENDUM  07/03/20  9:10 AM  Unable to reach patient via telephone.  PO appointment arranged 7/10/2020 at 1100.  Letter mailed to the patients home.    Pathology:  Copath Report  Patient Name: MIRIAM PRUETT   MR#: [id number]   Specimen #: [id number]   Collected: 6/30/2020   Received: 6/30/2020   Reported: 7/2/2020 17:33   Ordering Phy(s): STEFANY HERNÁNDEZ     For improved result formatting, select 'View Enhanced Report Format' under    Linked Documents section.     SPECIMEN(S):   Gallbladder and contents     FINAL DIAGNOSIS:   Gallbladder and contents, cholecystectomy:   - Chronic cholecystitis with reactive changes

## 2020-07-02 NOTE — LETTER
7/3/2020           TO: Mio Benoit  [address]  [place] [address]           Dear Mio,  I have been trying to reach you by phone, but unfortunately have not been able to as the telephone number we have on file is out of service. Please call our office at [phone] to update your telephone number so that you appointment can take place virtually.         Appointment date July 10, 2020   Appointment time 11:00 AM  This is a virtual video appointment   Provider Indy Sethi RN    Location Virtual video appointment  Phone: [phone]  Fax: [phone]     Sincerely,  Indy CARVALHO RN

## 2020-07-02 NOTE — PLAN OF CARE
"Assumed Care: 1500 - 1830  Admission/Status: S/P Cholecystectomy, ERCP with pancreatic stent    VS: /77 (BP Location: Right arm)   Pulse 98   Temp 98.3  F (36.8  C) (Oral)   Resp 16   Ht 1.727 m (5' 8\")   Wt 94.6 kg (208 lb 8 oz)   SpO2 98%   BMI 31.70 kg/m   - VSS on RA.  Neuro: Aox4. PERRLA, EOMI.  Extremities 5/5.  Cardiac: WDL.  Respiratory: WDL, clear.  GI/: Abdomen distended, appropriately tender.  Sounds audible and normoactive. +BM this shift, green and loose.  Viding spontaneously.  Nutrition: Regular diet, tolerated fairly.  Appetite low.  IV/Drains: IV pulled prior to discharge. No drains.  Activity: Up ad hina to restroom.  Pain: Minimal pain at this time.  Educated on avoiding tylenol and oxycodone simultaneously.  Skin: Intact.  Labs: Hemoglobin recheck trending upwards.    /77 (BP Location: Right arm)   Pulse 98   Temp 98.3  F (36.8  C) (Oral)   Resp 16   Ht 1.727 m (5' 8\")   Wt 94.6 kg (208 lb 8 oz)   SpO2 98%   BMI 31.70 kg/m      Patient status deemed adequate for discharge by primary team. Discharge details:     Discharge Instructions: Reviewed instructions and AVS reviewed with patient and family/caregivers. No further questions at this time    Patient Belongings: Patient acknowledges receipt of all personal belongings at time of discharge.     Prescriptions: Medications originally sent to [facility] in [place].  Changed to discharge pharmacy with complications.  Team to work with patient and family to identify pharmacy to fill remaining medications.    Disposition: Pt will return to home with family assist.    Transportation: Family to provide ride.    Patient left 7C at 1830.    "

## 2020-07-04 LAB
BACTERIA SPEC CULT: NO GROWTH
SPECIMEN SOURCE: NORMAL

## 2020-07-05 LAB
BACTERIA SPEC CULT: NO GROWTH
SPECIMEN SOURCE: NORMAL

## 2020-07-06 LAB
BACTERIA SPEC CULT: NO GROWTH
SPECIMEN SOURCE: NORMAL

## 2020-07-07 ENCOUNTER — DOCUMENTATION ONLY (OUTPATIENT)
Dept: CARE COORDINATION | Facility: CLINIC | Age: 55
End: 2020-07-07

## 2020-07-08 ENCOUNTER — TELEPHONE (OUTPATIENT)
Facility: CLINIC | Age: 55
End: 2020-07-08

## 2020-07-08 ENCOUNTER — PATIENT OUTREACH (OUTPATIENT)
Dept: GASTROENTEROLOGY | Facility: CLINIC | Age: 55
End: 2020-07-08

## 2020-07-08 DIAGNOSIS — Z46.89 ENCOUNTER FOR REMOVAL OF PANCREATIC STENT: Primary | ICD-10-CM

## 2020-07-08 NOTE — TELEPHONE ENCOUNTER
I attempted to call the patient on the phone number listed and it is not in service on multiple attempts    I received his ERCP result as he does not have a listed PCP and I would like to notify the patient he needs to establish with a PCP

## 2020-07-08 NOTE — TELEPHONE ENCOUNTER
Order placed for xray, patient called to notify of need for scan.     Number would not connect. Letter sent

## 2020-07-08 NOTE — LETTER
July 8, 2020    Mio Benoit                              [address]  [place] [address]    Dear Mio,    We tried to call you today but were unable to reach you by phone.    During your ERCP on 6/29/2020, Dr. Ferrari placed a stent that we expect will pass on its own. We'd like you to have an xray to check and make sure it is gone around July 27, 2020. Orders for xray have been placed within the [facility] system. Please call [phone] to schedule the imaging.     Please call our office with any questions.    Deidre Little RN Care Coordinator

## 2020-07-09 ENCOUNTER — PATIENT OUTREACH (OUTPATIENT)
Dept: SURGERY | Facility: CLINIC | Age: 55
End: 2020-07-09

## 2020-07-09 NOTE — PROGRESS NOTES
Established Patient Telephone Reminder Call    Date of call:  07/09/20  Phone numbers:  Home number on file [phone] (home)    Reached patient/confirmed appointment: No, number out of service.  Appointment with:   Dr. Nik Youngblood  Reason for visit:  Post op

## 2020-07-13 NOTE — OP NOTE
DATE OF OPERATION: 6/30/20    PREOPERATIVE DIAGNOSIS:  Ascending Cholangitis, Acute Cholecystitis    POSTOPERATIVE DIAGNOSIS:  Ascending Cholangitis, Acute Cholecystitis.    OPERATION PERFORMED:  Laparoscopic cholecystectomy.    SURGEON:  Renzo Carter MD    Assistant: Nik Youngblood MD, Fabi Vallejo DO    SEDATION:  General.    FINDINGS:  Severely inflammed gallbladder with deborah acute cholecystitis    COMPLICATIONS:  None.    ESTIMATED BLOOD LOSS:  200 mL    INDICATION FOR OPERATION:  55 year old male who presented with signs and symptoms of ascending cholangitis s/p ERCP.     DESCRIPTION OF OPERATION:  After consent was obtained, the patient was taken back to the operating room and placed in a supine position.  After induction of general anesthesia and endotracheal intubation, the abdomen was prepped and draped in normal sterile fashion.  Entrance was gained into the abdomen using supraumbilical incision using a Cristin technique.  Then, 2x 0 Vicryl stay sutures were placed in the fascia and Cristin port was advanced into the abdomen, followed by the 10 mm scope.  The gallbladder was visualized and it was very inflammed with adhesions and an inflammatory rind. Next, 5 mm ports were then inserted into the abdomen under direct visualization, first into the midline just below the xiphoid and the next two along the right costal margin.   Next, the gallbladder was then retracted cephalad and the peritoneum was stripped off the gallbladder, there was approximately a cm of wall thickness and inflammation.      We spent approximately 45 minutes clearing off the cystic duct and the cystic artery, we also dissected out the inferior aspect of the gallbladder wall. The view of safety was obtained.  Clips were placed on the cystic duct and the duct was transected.  Next, the cystic artery was isolated.  It was doubly clipped proximally and clipped distally and transected. Upon transection we noted bleeding from  an associated vessel posteriorly likely an aberrant cystic duct anatomy. This was clipped to temporize the bleeding. Dr. Youngblood scrubbed in to assist and we dissected out the vessel, confirmed it was a posterior cystic duct artery and not a major hepatic vessel. Dissected out this vessel and clipped it.    Next, the gallbladder was removed from the liver bed using electrocautery, and once the gallbladder was removed from the liver bed, it was placed into an Endocatch and removed through the umbilical port.After the gallbladder was successfully removed, again the 10 mm scope was placed into the abdomen.  The liver bed was evaluated, no bleeding was noted. We re-evaluated the clip sites there was no bleeding noted. The Liver appeared WNL.    The abdomen was then irrigated copiously with 2 liters of saline until the aspirate was clear. Finally we rechecked the gallbladder bed and clips. Next, the ports were removed under direct visualization and the fascia was closed with 0 interrupted Vicryl stitches.  After the fascia was closed in the umbilical port, the wounds were all irrigated out with saline and closed with Monocryl subcuticular stitches.  The wounds were dressed with dermabond .  The patient tolerated this procedure well.  Sponge and instrument counts were correct at the end of the case.  The patient then received an ERCP by GI medicine.    MD GREG Braun was present for the entire case from start to closure.

## 2020-07-30 ENCOUNTER — PATIENT OUTREACH (OUTPATIENT)
Dept: GASTROENTEROLOGY | Facility: CLINIC | Age: 55
End: 2020-07-30

## 2020-07-30 NOTE — TELEPHONE ENCOUNTER
Caled patient to discuss xray that is due to check for stent. Unable to reach, incorrect number.    Will send letter.     Deidre Little RN Care Coordinator

## 2020-07-30 NOTE — LETTER
July 30, 2020    Mio S Kaycee                              [id number]  [place] [address]    Dear Mio,     I am writing this letter to you because I have failed to reach you by phone.     You are due for an x-ray to verify the stent that was placed at the [facility] 6/28/2020  has passed on it's own. If it has not passed we will need to have you come back for a procedure to remove it. The stents cannot stay in you and can cause urgent medical problems if left in too long.     I have placed and order for x-ray and you can call to schedule it by calling [phone]. It can be done at any [facility].       Please call me with any questions.     Thank you,     Deidre Little, RN Care Coordinator  Dr. Ferrari, Dr. Mortensen & Dr. Bishop  [facility]  [phone]

## 2020-07-31 ENCOUNTER — ANCILLARY PROCEDURE (OUTPATIENT)
Dept: GENERAL RADIOLOGY | Facility: CLINIC | Age: 55
End: 2020-07-31
Attending: INTERNAL MEDICINE
Payer: COMMERCIAL

## 2020-07-31 ENCOUNTER — PATIENT OUTREACH (OUTPATIENT)
Dept: SURGERY | Facility: CLINIC | Age: 55
End: 2020-07-31

## 2020-07-31 DIAGNOSIS — Z46.89 ENCOUNTER FOR REMOVAL OF PANCREATIC STENT: ICD-10-CM

## 2020-07-31 NOTE — PROGRESS NOTES
Patient had a lap balbir with Dr. Carter on 6/30. Attempted to contact patient x 2 after surgery and was unable to reach patient. A pathology letter was then mailed to patient.    Patient is calling the office today stating his personal information wasn't up to date. He just received the pathology report this week. Confirmed with patient that his address and telephone number were correct.    Patient states he has been doing well since surgery. He has no complaints. His incisions seem to be healing well and the glue has flaked off. He is eating and drinking without issues and is having normal bowel movements. He does not need to take anything for pain. Discussed he may have pain for up to 8 weeks after surgery and he can use Tylenol/Ibuprofen prn. He is ok to resume normal activities and lifting.    He states he has his xray scheduled today for GI. RN sent a message to the GI team so they can watch for results.     Patient will call with any further questions or concerns if they arise.

## 2020-08-03 ENCOUNTER — PATIENT OUTREACH (OUTPATIENT)
Dept: GASTROENTEROLOGY | Facility: CLINIC | Age: 55
End: 2020-08-03

## 2020-08-03 NOTE — TELEPHONE ENCOUNTER
Per Dr. Ferrari, stents out.  Called to update patient. Left message.    Deidre Little, RN Care Coordinator

## 2020-11-22 ENCOUNTER — HEALTH MAINTENANCE LETTER (OUTPATIENT)
Age: 55
End: 2020-11-22

## 2021-04-04 ENCOUNTER — HEALTH MAINTENANCE LETTER (OUTPATIENT)
Age: 56
End: 2021-04-04

## 2021-07-25 ENCOUNTER — HEALTH MAINTENANCE LETTER (OUTPATIENT)
Age: 56
End: 2021-07-25

## 2021-09-19 ENCOUNTER — HEALTH MAINTENANCE LETTER (OUTPATIENT)
Age: 56
End: 2021-09-19

## 2021-11-14 ENCOUNTER — HEALTH MAINTENANCE LETTER (OUTPATIENT)
Age: 56
End: 2021-11-14

## 2022-01-09 ENCOUNTER — HEALTH MAINTENANCE LETTER (OUTPATIENT)
Age: 57
End: 2022-01-09

## 2022-03-06 ENCOUNTER — HEALTH MAINTENANCE LETTER (OUTPATIENT)
Age: 57
End: 2022-03-06

## 2022-06-25 ENCOUNTER — HEALTH MAINTENANCE LETTER (OUTPATIENT)
Age: 57
End: 2022-06-25

## 2022-11-20 ENCOUNTER — HEALTH MAINTENANCE LETTER (OUTPATIENT)
Age: 57
End: 2022-11-20

## 2023-04-15 ENCOUNTER — HEALTH MAINTENANCE LETTER (OUTPATIENT)
Age: 58
End: 2023-04-15

## (undated) DEVICE — SU VICRYL 0 UR-6 27" J603H

## (undated) DEVICE — LINEN TOWEL PACK X30 5481

## (undated) DEVICE — ENDO SCOPE WARMER SEAL  C3101

## (undated) DEVICE — ADH SKIN CLOSURE PREMIERPRO EXOFIN 1.0ML 3470

## (undated) DEVICE — NDL BLUNT 18GA 1" W/O FILTER 305181

## (undated) DEVICE — SOL NACL 0.9% IRRIG 1000ML BOTTLE 2F7124

## (undated) DEVICE — ENDO SNARE POLYPECTOMY OVAL 15MM LOOP SD-240U-15

## (undated) DEVICE — BLADE KNIFE SURG 15 371115

## (undated) DEVICE — GUIDEWIRE NOVAGOLD .018X260CM STR TIP M00552000

## (undated) DEVICE — SOL NACL 0.9% IRRIG 3000ML BAG 2B7477

## (undated) DEVICE — ENDO TUBING CO2 SMARTCAP STERILE DISP 100145CO2EXT

## (undated) DEVICE — SUCTION IRR STRYKERFLOW II W/TIP 250-070-520

## (undated) DEVICE — SYR 10ML LL W/O NDL 302995

## (undated) DEVICE — SOL WATER IRRIG 1000ML BOTTLE 2F7114

## (undated) DEVICE — GLOVE PROTEXIS POWDER FREE SMT 7.5  2D72PT75X

## (undated) DEVICE — SUCTION MANIFOLD DORNOCH ULTRA CART UL-CL500

## (undated) DEVICE — PROBE BIPOLAR HEMOSTASIS GOLD 07FRX210CM M00560150

## (undated) DEVICE — ENDO TROCAR SLEEVE KII Z-THREADED 05X100MM CTS02

## (undated) DEVICE — CLIP APPLIER ENDO 5MM M/L LIGAMAX EL5ML

## (undated) DEVICE — ENDO DISSECTOR BLUNT 05MM  BTD05

## (undated) DEVICE — PACK GOWN 3/PK DISP XL SBA32GPFCB

## (undated) DEVICE — ESU GROUND PAD ADULT W/CORD E7507

## (undated) DEVICE — CLIP ENDO HEMO-LOC GREEN MED/LG 544230

## (undated) DEVICE — CLIP HEMOCLIP ENDOSCOPIC INSTINCT 2.8X230CM INSC-7-230-SS

## (undated) DEVICE — GLOVE PROTEXIS BLUE W/NEU-THERA 7.5  2D73EB75

## (undated) DEVICE — BIOPSY VALVE BIOSHIELD 00711135

## (undated) DEVICE — LINEN TOWEL PACK X6 WHITE 5487

## (undated) DEVICE — ESU PENCIL W/COATED BLADE E2450H

## (undated) DEVICE — BLADE CLIPPER SGL USE 9680

## (undated) DEVICE — WIRE GUIDE 0.025"X270CM ANG VISIGLIDE G-240-2527A

## (undated) DEVICE — ENDO FUSION OMNI-TOME 21 FS-OMNI-21 G48675

## (undated) DEVICE — Device

## (undated) DEVICE — ENDO TROCAR BLUNT TIP KII BALLOON 12X100MM C0R47

## (undated) DEVICE — CANNULA BILIARY CONTOUR ERCP .018"X210CM 5-4-3 TIP

## (undated) DEVICE — SU MONOCRYL 4-0 PS-2 27" UND Y426H

## (undated) DEVICE — PACK ENDOSCOPY GI CUSTOM UMMC

## (undated) DEVICE — KIT ENDO FIRST STEP DISINFECTANT 200ML W/POUCH EP-4

## (undated) DEVICE — ESU ENDO SCISSORS 5MM CVD 5DCS

## (undated) DEVICE — INTR ENDOSCOPIC STENT FUSION OASIS 09.0FRX200CM

## (undated) DEVICE — ANTIFOG SOLUTION W/FOAM PAD 31142527

## (undated) DEVICE — PREP CHLORAPREP 26ML TINTED ORANGE  260815

## (undated) DEVICE — BALLOON EXTRACTION 15X1950MM 3.2MM TL B-V243Q-A

## (undated) DEVICE — SURGICEL HEMOSTAT 2X3" 1953

## (undated) DEVICE — ENDO POUCH UNIV RETRIEVAL SYSTEM INZII 10MM CD001

## (undated) RX ORDER — IOPAMIDOL 510 MG/ML
INJECTION, SOLUTION INTRAVASCULAR
Status: DISPENSED
Start: 2020-06-28

## (undated) RX ORDER — LEVOFLOXACIN 5 MG/ML
INJECTION, SOLUTION INTRAVENOUS
Status: DISPENSED
Start: 2020-06-28

## (undated) RX ORDER — SIMETHICONE 40MG/0.6ML
SUSPENSION, DROPS(FINAL DOSAGE FORM)(ML) ORAL
Status: DISPENSED
Start: 2020-06-28

## (undated) RX ORDER — LIDOCAINE HYDROCHLORIDE 20 MG/ML
INJECTION, SOLUTION EPIDURAL; INFILTRATION; INTRACAUDAL; PERINEURAL
Status: DISPENSED
Start: 2020-06-30

## (undated) RX ORDER — FENTANYL CITRATE 50 UG/ML
INJECTION, SOLUTION INTRAMUSCULAR; INTRAVENOUS
Status: DISPENSED
Start: 2020-06-28

## (undated) RX ORDER — BUPIVACAINE HYDROCHLORIDE 2.5 MG/ML
INJECTION, SOLUTION EPIDURAL; INFILTRATION; INTRACAUDAL
Status: DISPENSED
Start: 2020-06-30

## (undated) RX ORDER — FENTANYL CITRATE 50 UG/ML
INJECTION, SOLUTION INTRAMUSCULAR; INTRAVENOUS
Status: DISPENSED
Start: 2020-06-30

## (undated) RX ORDER — LIDOCAINE HYDROCHLORIDE AND EPINEPHRINE 10; 10 MG/ML; UG/ML
INJECTION, SOLUTION INFILTRATION; PERINEURAL
Status: DISPENSED
Start: 2020-06-30

## (undated) RX ORDER — INDOMETHACIN 50 MG/1
SUPPOSITORY RECTAL
Status: DISPENSED
Start: 2020-06-30

## (undated) RX ORDER — IOPAMIDOL 510 MG/ML
INJECTION, SOLUTION INTRAVASCULAR
Status: DISPENSED
Start: 2020-06-30

## (undated) RX ORDER — DEXAMETHASONE SODIUM PHOSPHATE 4 MG/ML
INJECTION, SOLUTION INTRA-ARTICULAR; INTRALESIONAL; INTRAMUSCULAR; INTRAVENOUS; SOFT TISSUE
Status: DISPENSED
Start: 2020-06-30

## (undated) RX ORDER — SIMETHICONE 40MG/0.6ML
SUSPENSION, DROPS(FINAL DOSAGE FORM)(ML) ORAL
Status: DISPENSED
Start: 2020-06-30